# Patient Record
Sex: FEMALE | HISPANIC OR LATINO | Employment: UNEMPLOYED | URBAN - METROPOLITAN AREA
[De-identification: names, ages, dates, MRNs, and addresses within clinical notes are randomized per-mention and may not be internally consistent; named-entity substitution may affect disease eponyms.]

---

## 2023-09-15 ENCOUNTER — APPOINTMENT (OUTPATIENT)
Dept: ULTRASOUND IMAGING | Facility: CLINIC | Age: 23
End: 2023-09-15
Attending: FAMILY MEDICINE

## 2023-09-15 ENCOUNTER — HOSPITAL ENCOUNTER (EMERGENCY)
Facility: CLINIC | Age: 23
Discharge: HOME | End: 2023-09-15
Attending: FAMILY MEDICINE | Admitting: FAMILY MEDICINE

## 2023-09-15 VITALS
WEIGHT: 114.1 LBS | SYSTOLIC BLOOD PRESSURE: 108 MMHG | TEMPERATURE: 98.7 F | HEART RATE: 85 BPM | DIASTOLIC BLOOD PRESSURE: 72 MMHG | RESPIRATION RATE: 16 BRPM | OXYGEN SATURATION: 100 %

## 2023-09-15 DIAGNOSIS — O21.0 HYPEREMESIS GRAVIDARUM: ICD-10-CM

## 2023-09-15 DIAGNOSIS — Z34.90 EARLY STAGE OF PREGNANCY: ICD-10-CM

## 2023-09-15 LAB
ALBUMIN SERPL BCG-MCNC: 4.4 G/DL (ref 3.5–5.2)
ALBUMIN UR-MCNC: NEGATIVE MG/DL
ALP SERPL-CCNC: 98 U/L (ref 35–104)
ALT SERPL W P-5'-P-CCNC: 22 U/L (ref 0–50)
ANION GAP SERPL CALCULATED.3IONS-SCNC: 11 MMOL/L (ref 7–15)
APPEARANCE UR: ABNORMAL
AST SERPL W P-5'-P-CCNC: 24 U/L (ref 0–45)
BACTERIA #/AREA URNS HPF: ABNORMAL /HPF
BASOPHILS # BLD AUTO: 0 10E3/UL (ref 0–0.2)
BASOPHILS NFR BLD AUTO: 1 %
BILIRUB SERPL-MCNC: 0.6 MG/DL
BILIRUB UR QL STRIP: NEGATIVE
BUN SERPL-MCNC: 9.1 MG/DL (ref 6–20)
CALCIUM SERPL-MCNC: 9.3 MG/DL (ref 8.6–10)
CHLORIDE SERPL-SCNC: 106 MMOL/L (ref 98–107)
COLOR UR AUTO: YELLOW
CREAT SERPL-MCNC: 0.56 MG/DL (ref 0.51–0.95)
DEPRECATED HCO3 PLAS-SCNC: 20 MMOL/L (ref 22–29)
EGFRCR SERPLBLD CKD-EPI 2021: >90 ML/MIN/1.73M2
EOSINOPHIL # BLD AUTO: 0.1 10E3/UL (ref 0–0.7)
EOSINOPHIL NFR BLD AUTO: 2 %
ERYTHROCYTE [DISTWIDTH] IN BLOOD BY AUTOMATED COUNT: 15.9 % (ref 10–15)
GLUCOSE SERPL-MCNC: 85 MG/DL (ref 70–99)
GLUCOSE UR STRIP-MCNC: NEGATIVE MG/DL
HCG INTACT+B SERPL-ACNC: 313 MIU/ML
HCG UR QL: POSITIVE
HCT VFR BLD AUTO: 39.7 % (ref 35–47)
HGB BLD-MCNC: 13.3 G/DL (ref 11.7–15.7)
HGB UR QL STRIP: NEGATIVE
HOLD SPECIMEN: NORMAL
IMM GRANULOCYTES # BLD: 0 10E3/UL
IMM GRANULOCYTES NFR BLD: 0 %
INTERNAL QC OK POCT: ABNORMAL
KETONES UR STRIP-MCNC: 20 MG/DL
LEUKOCYTE ESTERASE UR QL STRIP: NEGATIVE
LYMPHOCYTES # BLD AUTO: 2 10E3/UL (ref 0.8–5.3)
LYMPHOCYTES NFR BLD AUTO: 33 %
MCH RBC QN AUTO: 26.4 PG (ref 26.5–33)
MCHC RBC AUTO-ENTMCNC: 33.5 G/DL (ref 31.5–36.5)
MCV RBC AUTO: 79 FL (ref 78–100)
MONOCYTES # BLD AUTO: 0.3 10E3/UL (ref 0–1.3)
MONOCYTES NFR BLD AUTO: 5 %
MUCOUS THREADS #/AREA URNS LPF: PRESENT /LPF
NEUTROPHILS # BLD AUTO: 3.5 10E3/UL (ref 1.6–8.3)
NEUTROPHILS NFR BLD AUTO: 59 %
NITRATE UR QL: NEGATIVE
NRBC # BLD AUTO: 0 10E3/UL
NRBC BLD AUTO-RTO: 0 /100
PH UR STRIP: 5.5 [PH] (ref 5–7)
PLATELET # BLD AUTO: 306 10E3/UL (ref 150–450)
POCT KIT EXPIRATION DATE: ABNORMAL
POCT KIT LOT NUMBER: ABNORMAL
POTASSIUM SERPL-SCNC: 3.8 MMOL/L (ref 3.4–5.3)
PROT SERPL-MCNC: 7.4 G/DL (ref 6.4–8.3)
RBC # BLD AUTO: 5.04 10E6/UL (ref 3.8–5.2)
RBC URINE: <1 /HPF
SODIUM SERPL-SCNC: 137 MMOL/L (ref 136–145)
SP GR UR STRIP: 1.02 (ref 1–1.03)
SQUAMOUS EPITHELIAL: 11 /HPF
UROBILINOGEN UR STRIP-MCNC: NORMAL MG/DL
WBC # BLD AUTO: 5.9 10E3/UL (ref 4–11)
WBC URINE: 3 /HPF

## 2023-09-15 PROCEDURE — 99285 EMERGENCY DEPT VISIT HI MDM: CPT | Mod: 25 | Performed by: FAMILY MEDICINE

## 2023-09-15 PROCEDURE — 81003 URINALYSIS AUTO W/O SCOPE: CPT | Performed by: FAMILY MEDICINE

## 2023-09-15 PROCEDURE — 84702 CHORIONIC GONADOTROPIN TEST: CPT | Performed by: FAMILY MEDICINE

## 2023-09-15 PROCEDURE — 250N000011 HC RX IP 250 OP 636: Mod: JZ | Performed by: FAMILY MEDICINE

## 2023-09-15 PROCEDURE — 85025 COMPLETE CBC W/AUTO DIFF WBC: CPT | Performed by: FAMILY MEDICINE

## 2023-09-15 PROCEDURE — 80053 COMPREHEN METABOLIC PANEL: CPT | Performed by: FAMILY MEDICINE

## 2023-09-15 PROCEDURE — 96374 THER/PROPH/DIAG INJ IV PUSH: CPT | Performed by: FAMILY MEDICINE

## 2023-09-15 PROCEDURE — 99284 EMERGENCY DEPT VISIT MOD MDM: CPT | Performed by: FAMILY MEDICINE

## 2023-09-15 PROCEDURE — 76801 OB US < 14 WKS SINGLE FETUS: CPT

## 2023-09-15 PROCEDURE — 96361 HYDRATE IV INFUSION ADD-ON: CPT | Performed by: FAMILY MEDICINE

## 2023-09-15 PROCEDURE — 258N000003 HC RX IP 258 OP 636: Performed by: FAMILY MEDICINE

## 2023-09-15 PROCEDURE — 81025 URINE PREGNANCY TEST: CPT | Performed by: FAMILY MEDICINE

## 2023-09-15 PROCEDURE — 36415 COLL VENOUS BLD VENIPUNCTURE: CPT | Performed by: FAMILY MEDICINE

## 2023-09-15 RX ORDER — ONDANSETRON 4 MG/1
4 TABLET, ORALLY DISINTEGRATING ORAL EVERY 8 HOURS PRN
Qty: 20 TABLET | Refills: 0 | Status: SHIPPED | OUTPATIENT
Start: 2023-09-15 | End: 2023-09-22

## 2023-09-15 RX ORDER — ONDANSETRON 2 MG/ML
4 INJECTION INTRAMUSCULAR; INTRAVENOUS ONCE
Status: COMPLETED | OUTPATIENT
Start: 2023-09-15 | End: 2023-09-15

## 2023-09-15 RX ADMIN — ONDANSETRON 4 MG: 2 INJECTION INTRAMUSCULAR; INTRAVENOUS at 12:52

## 2023-09-15 RX ADMIN — SODIUM CHLORIDE 1000 ML: 9 INJECTION, SOLUTION INTRAVENOUS at 12:51

## 2023-09-15 ASSESSMENT — ACTIVITIES OF DAILY LIVING (ADL)
ADLS_ACUITY_SCORE: 35
ADLS_ACUITY_SCORE: 35

## 2023-09-15 NOTE — ED TRIAGE NOTES
Pt states she is having RLQ pain; pt admits to N/V with no diarrhea; pt admits to constipation; pt states pain has been present for 15 days; last BM this morning; 7/10 pain

## 2023-09-15 NOTE — DISCHARGE INSTRUCTIONS
Discharge to home May use Zofran for nausea at home follow-up with OB/GYN for early pregnancy may repeat hormone level hCG level in 2 days if having any abdominal pain.

## 2023-09-15 NOTE — ED PROVIDER NOTES
ED Provider Note  [facility]      History     Chief Complaint   Patient presents with    Abdominal Pain     RLQ     HPI  Kaelyn Reed is a 22 year old female who presents to the ER for abdominal pain.    Patient reports abdominal pain and nausea.  This has been happening for over 2 weeks.  She does not have a PCP.  Her last period was over 2 months ago.  She believes there is no chance of pregnancy although she is sexually active.  She states that she is careful.  Patient denies other symptoms no cough fevers chills no cardiac or lung complaint.  Past Medical History  History reviewed. No pertinent past medical history.  History reviewed. No pertinent surgical history.  ondansetron (ZOFRAN ODT) 4 MG ODT tab      No Known Allergies  Family History  History reviewed. No pertinent family history.  Social History   Social History     Tobacco Use    Smoking status: Never    Smokeless tobacco: Never   Substance Use Topics    Alcohol use: Never    Drug use: Never         A complete review of systems was performed with pertinent positives and negatives noted in the HPI, and all other systems negative.    Physical Exam   BP: 111/73  Pulse: 67  Temp: 98.7  F (37.1  C)  Resp: 14  Weight: 51.8 kg (114 lb 1.6 oz)  SpO2: 100 %  Physical Exam  Constitutional:       General: She is not in acute distress.     Appearance: Normal appearance. She is not diaphoretic.   HENT:      Head: Atraumatic.      Mouth/Throat:      Mouth: Mucous membranes are moist.   Eyes:      General: No scleral icterus.     Conjunctiva/sclera: Conjunctivae normal.   Cardiovascular:      Rate and Rhythm: Normal rate.      Heart sounds: Normal heart sounds.   Pulmonary:      Effort: No respiratory distress.      Breath sounds: Normal breath sounds.   Abdominal:      General: Abdomen is flat.      Tenderness: There is no abdominal tenderness.   Musculoskeletal:      Cervical back: Neck supple.   Skin:     General: Skin is warm.       Findings: No rash.   Neurological:      Mental Status: She is alert.           ED Course, Procedures, & Data      Procedures         Medications   sodium chloride 0.9% BOLUS 1,000 mL (0 mLs Intravenous Stopped 9/15/23 1441)   ondansetron (ZOFRAN) injection 4 mg (4 mg Intravenous $Given 9/15/23 1252)     Labs Ordered and Resulted from Time of ED Arrival to Time of ED Departure   COMPREHENSIVE METABOLIC PANEL - Abnormal       Result Value    Sodium 137      Potassium 3.8      Chloride 106      Carbon Dioxide (CO2) 20 (*)     Anion Gap 11      Urea Nitrogen 9.1      Creatinine 0.56      Calcium 9.3      Glucose 85      Alkaline Phosphatase 98      AST 24      ALT 22      Protein Total 7.4      Albumin 4.4      Bilirubin Total 0.6      GFR Estimate >90     ROUTINE UA WITH MICROSCOPIC REFLEX TO CULTURE - Abnormal    Color Urine Yellow      Appearance Urine Slightly Cloudy (*)     Glucose Urine Negative      Bilirubin Urine Negative      Ketones Urine 20 (*)     Specific Gravity Urine 1.020      Blood Urine Negative      pH Urine 5.5      Protein Albumin Urine Negative      Urobilinogen Urine Normal      Nitrite Urine Negative      Leukocyte Esterase Urine Negative      Bacteria Urine Few (*)     Mucus Urine Present (*)     RBC Urine <1      WBC Urine 3      Squamous Epithelials Urine 11 (*)    CBC WITH PLATELETS AND DIFFERENTIAL - Abnormal    WBC Count 5.9      RBC Count 5.04      Hemoglobin 13.3      Hematocrit 39.7      MCV 79      MCH 26.4 (*)     MCHC 33.5      RDW 15.9 (*)     Platelet Count 306      % Neutrophils 59      % Lymphocytes 33      % Monocytes 5      % Eosinophils 2      % Basophils 1      % Immature Granulocytes 0      NRBCs per 100 WBC 0      Absolute Neutrophils 3.5      Absolute Lymphocytes 2.0      Absolute Monocytes 0.3      Absolute Eosinophils 0.1      Absolute Basophils 0.0      Absolute Immature Granulocytes 0.0      Absolute NRBCs 0.0     HCG QUANTITATIVE PREGNANCY - Abnormal    hCG  Quantitative 313 (*)    HCG QUALITATIVE URINE POCT - Abnormal    HCG Qual Urine Positive      Internal QC Check POCT Valid      POCT Kit Lot Number [id number]      POCT Kit Expiration Date 8/28/2024       US OB <14 Weeks W Transvaginal   Final Result   IMPRESSION:       1. No evidence of intrauterine pregnancy or adnexal mass. Findings are   technically consistent with a pregnancy of unknown location.   Short-term follow-up and serial beta hCG is recommended.      2. Trace endometrial fluid. Nonspecific subendometrial cysts.   Differential includes adenomyosis.      3. Ovaries are unremarkable.      ZIGGY JONES MD            SYSTEM ID:  [id number]             Critical care was not performed.     Medical Decision Making  The patient's presentation was of moderate complication acute illness systemic effects..    The patient's evaluation involved:  review of 3+ test result(s) ordered prior to this encounter (see separate area of note for details)    The patient's management necessitated moderate risk (prescription drug management including medications given in the ED).    Assessment & Plan        I have reviewed the nursing notes. I have reviewed the findings, diagnosis, plan and need for follow up with the patient.    Discharge Medication List as of 9/15/2023  3:11 PM        START taking these medications    Details   ondansetron (ZOFRAN ODT) 4 MG ODT tab Take 1 tablet (4 mg) by mouth every 8 hours as needed for nausea, Disp-20 tablet, R-0, E-Prescribe           Patient with nausea vomiting mild abdominal discomfort at this time would appear to have hyperemesis gravidarum I do not believe that she has an exam consistent with any concern for ectopic pregnancy however we did do an hCG level and if she is developing any abdominal pain would recheck the level otherwise will be following up with OB/GYN.            Final diagnoses:   Hyperemesis gravidarum   Early stage of pregnancy     IVaishali, am  serving as a trained medical scribe to document services personally performed by Chandu Durham MD, based on the provider's statements to me.     I, Chandu Durham MD, was physically present and have reviewed and verified the accuracy of this note documented by Vaishali Cornelius.    Chandu Durham MD  [facility] EMERGENCY DEPARTMENT  9/15/2023     Chandu Durham MD  09/15/23 2036

## 2023-10-27 ENCOUNTER — HOSPITAL ENCOUNTER (EMERGENCY)
Facility: CLINIC | Age: 23
Discharge: HOME | End: 2023-10-27
Attending: EMERGENCY MEDICINE | Admitting: EMERGENCY MEDICINE

## 2023-10-27 ENCOUNTER — APPOINTMENT (OUTPATIENT)
Dept: ULTRASOUND IMAGING | Facility: CLINIC | Age: 23
End: 2023-10-27
Attending: EMERGENCY MEDICINE

## 2023-10-27 VITALS
HEART RATE: 65 BPM | OXYGEN SATURATION: 98 % | SYSTOLIC BLOOD PRESSURE: 109 MMHG | TEMPERATURE: 98.7 F | RESPIRATION RATE: 16 BRPM | DIASTOLIC BLOOD PRESSURE: 72 MMHG

## 2023-10-27 DIAGNOSIS — Z34.91 FIRST TRIMESTER PREGNANCY: ICD-10-CM

## 2023-10-27 LAB
ALBUMIN SERPL BCG-MCNC: 4.2 G/DL (ref 3.5–5.2)
ALBUMIN UR-MCNC: NEGATIVE MG/DL
ALP SERPL-CCNC: 78 U/L (ref 35–104)
ALT SERPL W P-5'-P-CCNC: 19 U/L (ref 0–50)
ANION GAP SERPL CALCULATED.3IONS-SCNC: 12 MMOL/L (ref 7–15)
APPEARANCE UR: CLEAR
AST SERPL W P-5'-P-CCNC: 24 U/L (ref 0–45)
BASOPHILS # BLD AUTO: 0.1 10E3/UL (ref 0–0.2)
BASOPHILS NFR BLD AUTO: 1 %
BILIRUB SERPL-MCNC: 0.4 MG/DL
BILIRUB UR QL STRIP: NEGATIVE
BUN SERPL-MCNC: 7.3 MG/DL (ref 6–20)
CALCIUM SERPL-MCNC: 9.2 MG/DL (ref 8.6–10)
CHLORIDE SERPL-SCNC: 102 MMOL/L (ref 98–107)
COLOR UR AUTO: ABNORMAL
CREAT SERPL-MCNC: 0.5 MG/DL (ref 0.51–0.95)
DEPRECATED HCO3 PLAS-SCNC: 19 MMOL/L (ref 22–29)
EGFRCR SERPLBLD CKD-EPI 2021: >90 ML/MIN/1.73M2
EOSINOPHIL # BLD AUTO: 0.1 10E3/UL (ref 0–0.7)
EOSINOPHIL NFR BLD AUTO: 2 %
ERYTHROCYTE [DISTWIDTH] IN BLOOD BY AUTOMATED COUNT: 15 % (ref 10–15)
GLUCOSE SERPL-MCNC: 74 MG/DL (ref 70–99)
GLUCOSE UR STRIP-MCNC: NEGATIVE MG/DL
HCG INTACT+B SERPL-ACNC: ABNORMAL MIU/ML
HCT VFR BLD AUTO: 37.6 % (ref 35–47)
HGB BLD-MCNC: 12.6 G/DL (ref 11.7–15.7)
HGB UR QL STRIP: NEGATIVE
IMM GRANULOCYTES # BLD: 0 10E3/UL
IMM GRANULOCYTES NFR BLD: 0 %
KETONES UR STRIP-MCNC: NEGATIVE MG/DL
LEUKOCYTE ESTERASE UR QL STRIP: NEGATIVE
LIPASE SERPL-CCNC: 27 U/L (ref 13–60)
LYMPHOCYTES # BLD AUTO: 1.9 10E3/UL (ref 0.8–5.3)
LYMPHOCYTES NFR BLD AUTO: 30 %
MAGNESIUM SERPL-MCNC: 2 MG/DL (ref 1.7–2.3)
MCH RBC QN AUTO: 26.9 PG (ref 26.5–33)
MCHC RBC AUTO-ENTMCNC: 33.5 G/DL (ref 31.5–36.5)
MCV RBC AUTO: 80 FL (ref 78–100)
MONOCYTES # BLD AUTO: 0.4 10E3/UL (ref 0–1.3)
MONOCYTES NFR BLD AUTO: 6 %
MUCOUS THREADS #/AREA URNS LPF: PRESENT /LPF
NEUTROPHILS # BLD AUTO: 3.9 10E3/UL (ref 1.6–8.3)
NEUTROPHILS NFR BLD AUTO: 61 %
NITRATE UR QL: NEGATIVE
NRBC # BLD AUTO: 0 10E3/UL
NRBC BLD AUTO-RTO: 0 /100
PH UR STRIP: 6 [PH] (ref 5–7)
PLATELET # BLD AUTO: 326 10E3/UL (ref 150–450)
POTASSIUM SERPL-SCNC: 4.1 MMOL/L (ref 3.4–5.3)
PROT SERPL-MCNC: 7 G/DL (ref 6.4–8.3)
RBC # BLD AUTO: 4.68 10E6/UL (ref 3.8–5.2)
RBC URINE: 0 /HPF
SODIUM SERPL-SCNC: 133 MMOL/L (ref 135–145)
SP GR UR STRIP: 1.02 (ref 1–1.03)
SQUAMOUS EPITHELIAL: 10 /HPF
UROBILINOGEN UR STRIP-MCNC: NORMAL MG/DL
WBC # BLD AUTO: 6.3 10E3/UL (ref 4–11)
WBC URINE: 2 /HPF

## 2023-10-27 PROCEDURE — 83735 ASSAY OF MAGNESIUM: CPT | Performed by: EMERGENCY MEDICINE

## 2023-10-27 PROCEDURE — 85025 COMPLETE CBC W/AUTO DIFF WBC: CPT | Performed by: EMERGENCY MEDICINE

## 2023-10-27 PROCEDURE — 81001 URINALYSIS AUTO W/SCOPE: CPT | Performed by: EMERGENCY MEDICINE

## 2023-10-27 PROCEDURE — 84702 CHORIONIC GONADOTROPIN TEST: CPT | Performed by: EMERGENCY MEDICINE

## 2023-10-27 PROCEDURE — 76801 OB US < 14 WKS SINGLE FETUS: CPT

## 2023-10-27 PROCEDURE — 36415 COLL VENOUS BLD VENIPUNCTURE: CPT | Performed by: EMERGENCY MEDICINE

## 2023-10-27 PROCEDURE — 80053 COMPREHEN METABOLIC PANEL: CPT | Performed by: EMERGENCY MEDICINE

## 2023-10-27 PROCEDURE — 99284 EMERGENCY DEPT VISIT MOD MDM: CPT | Performed by: EMERGENCY MEDICINE

## 2023-10-27 PROCEDURE — 83690 ASSAY OF LIPASE: CPT | Performed by: EMERGENCY MEDICINE

## 2023-10-27 PROCEDURE — 99284 EMERGENCY DEPT VISIT MOD MDM: CPT | Mod: 25 | Performed by: EMERGENCY MEDICINE

## 2023-10-27 RX ORDER — PRENATAL VIT/IRON FUM/FOLIC AC 27MG-0.8MG
1 TABLET ORAL DAILY
Qty: 90 TABLET | Refills: 3 | Status: SHIPPED | OUTPATIENT
Start: 2023-10-27 | End: 2023-11-08

## 2023-10-27 RX ORDER — ACETAMINOPHEN 500 MG
1000 TABLET ORAL EVERY 6 HOURS PRN
Qty: 60 TABLET | Refills: 0 | Status: SHIPPED | OUTPATIENT
Start: 2023-10-27 | End: 2023-11-04

## 2023-10-27 NOTE — ED PROVIDER NOTES
[facility] EMERGENCY DEPARTMENT ([facility])    10/27/23      ED PROVIDER NOTE    History     Chief Complaint   Patient presents with    Pregnancy Complications     Patient having severe lower abdominal pain and cramping, reports it started 3 days ago. Patient denies vaginal bleeding.      HPI  Kaelyn Reed is a 22 year old pregnant female presenting to the ED for evaluation of pregnancy. Reports suprapubic pain. No bleeding. History obtained with an  and provided by the patient and her significant other. Patient reports that she has had a few days of suprapubic discomfort and has not yet had imaging to establish confirmation of intrauterine pregnancy. She denies vaginal bleeding or discharge. Denies fevers, chills, vomiting, diarrhea, chest pain, shortness of breath, or other acute complaints at this time. She has not been taking prenatal vitamins and is not yet established with obgyn.         Past Medical History  No past medical history on file.  No past surgical history on file.  acetaminophen (TYLENOL) 500 MG tablet  Prenatal Vit-Fe Fumarate-FA (PRENATAL MULTIVITAMIN W/IRON) 27-0.8 MG tablet      No Known Allergies  Family History  No family history on file.  Social History   Social History     Tobacco Use    Smoking status: Never    Smokeless tobacco: Never   Substance Use Topics    Alcohol use: Never    Drug use: Never      Past medical history, past surgical history, medications, allergies, family history, and social history were reviewed with the patient. No additional pertinent items.      A complete review of systems was performed with pertinent positives and negatives noted in the HPI, and all other systems negative.    Physical Exam     BP: 109/72  Pulse: 65  Temp: 98.7  F (37.1  C)  Resp: 16  SpO2: 98 %    Physical Exam  Vitals reviewed.   Constitutional:       Appearance: Normal appearance.   HENT:      Head: Normocephalic and atraumatic.      Mouth/Throat:      Mouth:  Mucous membranes are dry.   Eyes:      Extraocular Movements: Extraocular movements intact.      Pupils: Pupils are equal, round, and reactive to light.   Cardiovascular:      Rate and Rhythm: Normal rate and regular rhythm.   Pulmonary:      Effort: Pulmonary effort is normal. No respiratory distress.   Abdominal:      Palpations: Abdomen is soft.      Tenderness: There is no abdominal tenderness.   Musculoskeletal:      Cervical back: Normal range of motion and neck supple.   Skin:     General: Skin is warm and dry.   Neurological:      General: No focal deficit present.      Mental Status: She is alert and oriented to person, place, and time.   Psychiatric:         Mood and Affect: Mood normal.         Behavior: Behavior normal.           ED Course, Procedures, & Data        Procedures                 Results for orders placed or performed during the hospital encounter of 10/27/23   US OB < 14 Weeks Single     Status: None    Narrative    ULTRASOUND OBSTETRIC FIRST TRIMESTER October 27, 2023 1:25 PM    HISTORY: Abdomen pain, pregnancy 1st trimester, no prior ultrasound  confirmation.    TECHNIQUE: Transabdominal imaging was performed.      COMPARISON: None.    FINDINGS:      Estimated gestational age by current ultrasound measurement: 10 weeks  2 days.  Estimated date of delivery based on this ultrasound: May 22, 2024.  Crown-rump length: 3.3 cm.   Embryonic cardiac activity: 167 BPM.   Yolk sac: Present. Unremarkable shape.  Subchorionic hemorrhage: None.  Amniotic fluid volume: Unremarkable for gestational age.    Right ovary: Unremarkable.  Left ovary: Corpus luteum.  Adnexal mass: None.  Free pelvic fluid: None.      Impression    IMPRESSION: Single living intrauterine gestation, no acute process  demonstrated.    DOUG COLLADO MD         SYSTEM ID:  [id number]   Comprehensive metabolic panel     Status: Abnormal   Result Value Ref Range    Sodium 133 (L) 135 - 145 mmol/L    Potassium 4.1 3.4 - 5.3 mmol/L     Carbon Dioxide (CO2) 19 (L) 22 - 29 mmol/L    Anion Gap 12 7 - 15 mmol/L    Urea Nitrogen 7.3 6.0 - 20.0 mg/dL    Creatinine 0.50 (L) 0.51 - 0.95 mg/dL    GFR Estimate >90 >60 mL/min/1.73m2    Calcium 9.2 8.6 - 10.0 mg/dL    Chloride 102 98 - 107 mmol/L    Glucose 74 70 - 99 mg/dL    Alkaline Phosphatase 78 35 - 104 U/L    AST 24 0 - 45 U/L    ALT 19 0 - 50 U/L    Protein Total 7.0 6.4 - 8.3 g/dL    Albumin 4.2 3.5 - 5.2 g/dL    Bilirubin Total 0.4 <=1.2 mg/dL   Lipase     Status: Normal   Result Value Ref Range    Lipase 27 13 - 60 U/L   Magnesium     Status: Normal   Result Value Ref Range    Magnesium 2.0 1.7 - 2.3 mg/dL   HCG quantitative pregnancy (blood)     Status: Abnormal   Result Value Ref Range    hCG Quantitative 110,450 (H) <5 mIU/mL   UA with Microscopic reflex to Culture     Status: Abnormal    Specimen: Urine, Clean Catch   Result Value Ref Range    Color Urine Orange (A) Colorless, Straw, Light Yellow, Yellow    Appearance Urine Clear Clear    Glucose Urine Negative Negative mg/dL    Bilirubin Urine Negative Negative    Ketones Urine Negative Negative mg/dL    Specific Gravity Urine 1.018 1.003 - 1.035    Blood Urine Negative Negative    pH Urine 6.0 5.0 - 7.0    Protein Albumin Urine Negative Negative mg/dL    Urobilinogen Urine Normal Normal, 2.0 mg/dL    Nitrite Urine Negative Negative    Leukocyte Esterase Urine Negative Negative    Mucus Urine Present (A) None Seen /LPF    RBC Urine 0 <=2 /HPF    WBC Urine 2 <=5 /HPF    Squamous Epithelials Urine 10 (H) <=1 /HPF    Narrative    Urine Culture not indicated   CBC with platelets and differential     Status: None   Result Value Ref Range    WBC Count 6.3 4.0 - 11.0 10e3/uL    RBC Count 4.68 3.80 - 5.20 10e6/uL    Hemoglobin 12.6 11.7 - 15.7 g/dL    Hematocrit 37.6 35.0 - 47.0 %    MCV 80 78 - 100 fL    MCH 26.9 26.5 - 33.0 pg    MCHC 33.5 31.5 - 36.5 g/dL    RDW 15.0 10.0 - 15.0 %    Platelet Count 326 150 - 450 10e3/uL    % Neutrophils 61 %    %  Lymphocytes 30 %    % Monocytes 6 %    % Eosinophils 2 %    % Basophils 1 %    % Immature Granulocytes 0 %    NRBCs per 100 WBC 0 <1 /100    Absolute Neutrophils 3.9 1.6 - 8.3 10e3/uL    Absolute Lymphocytes 1.9 0.8 - 5.3 10e3/uL    Absolute Monocytes 0.4 0.0 - 1.3 10e3/uL    Absolute Eosinophils 0.1 0.0 - 0.7 10e3/uL    Absolute Basophils 0.1 0.0 - 0.2 10e3/uL    Absolute Immature Granulocytes 0.0 <=0.4 10e3/uL    Absolute NRBCs 0.0 10e3/uL   CBC with platelets differential     Status: None    Narrative    The following orders were created for panel order CBC with platelets differential.  Procedure                               Abnormality         Status                     ---------                               -----------         ------                     CBC with platelets and d...[[id number]]                      Final result                 Please view results for these tests on the individual orders.     Medications - No data to display  Labs Ordered and Resulted from Time of ED Arrival to Time of ED Departure   COMPREHENSIVE METABOLIC PANEL - Abnormal       Result Value    Sodium 133 (*)     Potassium 4.1      Carbon Dioxide (CO2) 19 (*)     Anion Gap 12      Urea Nitrogen 7.3      Creatinine 0.50 (*)     GFR Estimate >90      Calcium 9.2      Chloride 102      Glucose 74      Alkaline Phosphatase 78      AST 24      ALT 19      Protein Total 7.0      Albumin 4.2      Bilirubin Total 0.4     HCG QUANTITATIVE PREGNANCY - Abnormal    hCG Quantitative 110,450 (*)    ROUTINE UA WITH MICROSCOPIC REFLEX TO CULTURE - Abnormal    Color Urine Orange (*)     Appearance Urine Clear      Glucose Urine Negative      Bilirubin Urine Negative      Ketones Urine Negative      Specific Gravity Urine 1.018      Blood Urine Negative      pH Urine 6.0      Protein Albumin Urine Negative      Urobilinogen Urine Normal      Nitrite Urine Negative      Leukocyte Esterase Urine Negative      Mucus Urine Present (*)     RBC Urine 0       WBC Urine 2      Squamous Epithelials Urine 10 (*)    LIPASE - Normal    Lipase 27     MAGNESIUM - Normal    Magnesium 2.0     CBC WITH PLATELETS AND DIFFERENTIAL    WBC Count 6.3      RBC Count 4.68      Hemoglobin 12.6      Hematocrit 37.6      MCV 80      MCH 26.9      MCHC 33.5      RDW 15.0      Platelet Count 326      % Neutrophils 61      % Lymphocytes 30      % Monocytes 6      % Eosinophils 2      % Basophils 1      % Immature Granulocytes 0      NRBCs per 100 WBC 0      Absolute Neutrophils 3.9      Absolute Lymphocytes 1.9      Absolute Monocytes 0.4      Absolute Eosinophils 0.1      Absolute Basophils 0.1      Absolute Immature Granulocytes 0.0      Absolute NRBCs 0.0       US OB < 14 Weeks Single   Final Result   IMPRESSION: Single living intrauterine gestation, no acute process   demonstrated.      DOUG COLLADO MD            SYSTEM ID:  [id number]             Critical care was not performed.     Medical Decision Making  The patient's presentation was of moderate complexity (an undiagnosed new problem with uncertain diagnosis).    The patient's evaluation involved:  ordering and/or review of 3+ test(s) in this encounter (see separate area of note for details)    The patient's management necessitated only low risk treatment.    Assessment & Plan        Kaelyn Reed is a 22 year old pregnant female presenting to the ED for evaluation of pregnancy.  Patient is nontoxic appearing in the emergency department and has vital signs within normal limits. She has a nontender abdomen with very early gravid appearance. Ultrasound obtained and consistent with viable intrauterine pregnancy at about 10 weeks gestation. Patient had no vaginal bleeding and therefore no need for consideration of rhogam at this time. Basic labs reassuring. OBGYN referral placed and prenatal vitamins prescribed. Patient felt reassured with these findings and was discharged with outpatient follow up and return precautions.  Urinalysis negative for infection.     I have reviewed the nursing notes. I have reviewed the findings, diagnosis, plan and need for follow up with the patient.    Discharge Medication List as of 10/27/2023  3:14 PM        START taking these medications    Details   acetaminophen (TYLENOL) 500 MG tablet Take 2 tablets (1,000 mg) by mouth every 6 hours as needed for pain or fever, Disp-60 tablet, R-0, Local Print      Prenatal Vit-Fe Fumarate-FA (PRENATAL MULTIVITAMIN W/IRON) 27-0.8 MG tablet Take 1 tablet by mouth daily, Disp-90 tablet, R-3, Local Print             Final diagnoses:   First trimester pregnancy     Ceasar Hdz MD  [facility] EMERGENCY DEPARTMENT  10/27/2023     Ceasar Hdz MD  10/31/23 1511

## 2023-10-27 NOTE — ED TRIAGE NOTES
Patient has severe lower abdominal pain. Patient found out she was pregnant when she here a month ago. Patient is unsure how far along she is, but is in the first trimester. Patient reports for the past three days she has had cramping and constant pain.

## 2023-11-08 ENCOUNTER — VIRTUAL VISIT (OUTPATIENT)
Dept: OBGYN | Facility: CLINIC | Age: 23
End: 2023-11-08

## 2023-11-08 DIAGNOSIS — Z34.91 FIRST TRIMESTER PREGNANCY: Primary | ICD-10-CM

## 2023-11-08 DIAGNOSIS — Z13.31 POSITIVE SCREENING FOR DEPRESSION ON 9-ITEM PATIENT HEALTH QUESTIONNAIRE (PHQ-9): ICD-10-CM

## 2023-11-08 DIAGNOSIS — O21.9 NAUSEA AND VOMITING IN PREGNANCY: ICD-10-CM

## 2023-11-08 RX ORDER — PYRIDOXINE HCL (VITAMIN B6) 25 MG
25 TABLET ORAL EVERY 8 HOURS PRN
Qty: 90 TABLET | Refills: 1 | Status: ON HOLD | OUTPATIENT
Start: 2023-11-08 | End: 2024-05-13

## 2023-11-08 RX ORDER — PRENATAL VIT/IRON FUM/FOLIC AC 27MG-0.8MG
1 TABLET ORAL DAILY
Qty: 90 TABLET | Refills: 3 | Status: SHIPPED | OUTPATIENT
Start: 2023-11-08 | End: 2024-03-22

## 2023-11-08 ASSESSMENT — PATIENT HEALTH QUESTIONNAIRE - PHQ9: SUM OF ALL RESPONSES TO PHQ QUESTIONS 1-9: 10

## 2023-11-08 NOTE — PATIENT INSTRUCTIONS
Valorie por confiarnos fitzpatrick cuidado!    Si necesita contactarnos para preguntas sobre:  Síntomas, programación y preguntas médicas; No urgente (respuesta de 2 a 3 días) Mensaje justino Turpin Urgent[place] (necesita respuesta hoza) [phone] (si responde después de las 3:30 p. m. del día siguiente)  Recetas: llame a fitzpatrick farmacia  Facturación: [place] [phone] o M Médicos: [phone]   Learning About Pregnancy  Your Care Instructions     Your health in the early weeks of your pregnancy is particularly important for your baby's health. Take good care of yourself. Anything you do that harms your body can also harm your baby.  Make sure to go to all of your doctor appointments. Regular checkups will help keep you and your baby healthy.  How can you care for yourself at home?  Diet    Eat a balanced diet. Make sure your diet includes plenty of beans, peas, and leafy green vegetables.     Do not skip meals or go for many hours without eating. If you are nauseated, try to eat a small, healthy snack every 2 to 3 hours.     Do not eat fish that has a high level of mercury, such as shark, swordfish, or mackerel. Do not eat more than one can of tuna each week.     Drink plenty of fluids. If you have kidney, heart, or liver disease and have to limit fluids, talk with your doctor before you increase the amount of fluids you drink.     Cut down on caffeine, such as coffee, tea, and cola.     Do not drink alcohol, such as beer, wine, or hard liquor.     Take a multivitamin that contains at least 400 micrograms (mcg) of folic acid to help prevent birth defects. Fortified cereal and whole wheat bread are good additional sources of folic acid.     Increase the calcium in your diet. Try to drink a quart of skim milk each day. You may also take calcium supplements and choose foods such as cheese and yogurt.   Lifestyle    Make sure you go to your follow-up appointments.     Get plenty of rest. You may be unusually tired while you are  pregnant.     Get at least 30 minutes of exercise on most days of the week. Walking is a good choice. If you have not exercised in the past, start out slowly. Take several short walks each day.     Do not smoke. If you need help quitting, talk to your doctor about stop-smoking programs. These can increase your chances of quitting for good.     Do not touch cat feces or litter boxes. Also, wash your hands after you handle raw meat, and fully cook all meat before you eat it. Wear gloves when you work in the yard or garden, and wash your hands well when you are done. Cat feces, raw or undercooked meat, and contaminated dirt can cause an infection that may harm your baby or lead to a miscarriage.     Avoid things that can make your body too hot and may be harmful to your baby, such as a hot tub or sauna. Or talk with your doctor before doing anything that raises your body temperature. Your doctor can tell you if it's safe.     Avoid chemical fumes, paint fumes, or poisons.     Do not use illegal drugs, marijuana, or alcohol.   Medicines    Review all of your medicines with your doctor. Some of your routine medicines may need to be changed to protect your baby.     Use acetaminophen (Tylenol) to relieve minor problems, such as a mild headache or backache or a mild fever with cold symptoms. Do not use nonsteroidal anti-inflammatory drugs (NSAIDs), such as ibuprofen (Advil, Motrin) or naproxen (Aleve), unless your doctor says it is okay.     Do not take two or more pain medicines at the same time unless the doctor told you to. Many pain medicines have acetaminophen, which is Tylenol. Too much acetaminophen (Tylenol) can be harmful.     Take your medicines exactly as prescribed. Call your doctor if you think you are having a problem with your medicine.   To manage morning sickness    If you feel sick when you first wake up, try eating a small snack (such as crackers) before you get out of bed. Allow some time to digest the  "snack, and then get out of bed slowly.     Do not skip meals or go for long periods without eating. An empty stomach can make nausea worse.     Eat small, frequent meals instead of three large meals each day.     Drink plenty of fluids.     Eat foods that are high in protein but low in fat.     If you are taking iron supplements, ask your doctor if they are necessary. Iron can make nausea worse.     Avoid any smells, such as coffee, that make you feel sick.     Get lots of rest. Morning sickness may be worse when you are tired.   Follow-up care is a key part of your treatment and safety. Be sure to make and go to all appointments, and call your doctor if you are having problems. It's also a good idea to know your test results and keep a list of the medicines you take.  Where can you learn more?  Go to https://www.[employer].net/patiented  Enter E868 in the search box to learn more about \"Learning About Pregnancy.\"  Current as of: July 11, 2023               Content Version: 13.8    2757-5540 [facility].   Care instructions adapted under license by your healthcare professional. If you have questions about a medical condition or this instruction, always ask your healthcare professional. [facility] disclaims any warranty or liability for your use of this information.      Weeks 6 to 10 of Your Pregnancy: Care Instructions  During these weeks of pregnancy, your body goes through many changes. You may start to feel different, both in your body and your emotions. Each pregnancy is different, so there's no \"right\" way to feel. These early weeks are a time to make healthy choices for you and your pregnancy.    Take a daily prenatal vitamin. Choose one with folic acid in it.   Avoid alcohol, tobacco, and drugs (including marijuana). If you need help quitting, talk to your doctor.     Drink plenty of liquids.  Be sure to drink enough water. And limit sodas, other sweetened drinks, and caffeine.     " "Choose foods that are good sources of calcium, iron, and folate.  You can try dairy products, dark leafy greens, fortified orange juice and cereals, almonds, broccoli, dried fruit, and beans.     Avoid foods that may be harmful.  Don't eat raw meat, deli meat, raw seafood, or raw eggs. Avoid soft cheese and unpasteurized dairy, like Brie and blue cheese. And don't eat fish that contains a lot of mercury, like shark and swordfish.     Don't touch sol litter or cat poop.  They can cause an infection that could be harmful during pregnancy.     Avoid things that can make your body too hot.  For example, avoid hot tubs and saunas.     Soothe morning sickness.  Try eating 5 or 6 small meals a day, getting some fresh air, or using adam to control symptoms.     Ask your doctor about flu and COVID-19 shots.  Getting them can help protect against infection.   Follow-up care is a key part of your treatment and safety. Be sure to make and go to all appointments, and call your doctor if you are having problems. It's also a good idea to know your test results and keep a list of the medicines you take.  Where can you learn more?  Go to https://www.GoEuro.net/patiented  Enter G112 in the search box to learn more about \"Weeks 6 to 10 of Your Pregnancy: Care Instructions.\"  Current as of: July 11, 2023               Content Version: 13.8    8349-8603 [employer].   Care instructions adapted under license by your healthcare professional. If you have questions about a medical condition or this instruction, always ask your healthcare professional. [employer] disclaims any warranty or liability for your use of this information.         Managing Morning Sickness (01:55)  Your health professional recommends that you watch this short online health video.  Learn tips for dealing with morning sickness, no matter what time of day you have it.  Purpose:  Gives tips for managing morning sickness, including " eating small low-fat meals and avoiding caffeine and spicy food.  Goal:  The user will learn tips for dealing with morning sickness during pregnancy.     How to watch the video    Scan the QR code   OR Visit the website    [email or web address]   Current as of: July 11, 2023               Content Version: 13.8    9412-3659 [employer].   Care instructions adapted under license by your healthcare professional. If you have questions about a medical condition or this instruction, always ask your healthcare professional. [employer] disclaims any warranty or liability for your use of this information.      Pregnancy and Heartburn: Care Instructions  Overview     Heartburn is a common problem during pregnancy.  Heartburn happens when stomach acid backs up into the tube that carries food to the stomach. This tube is called the esophagus. Early in pregnancy, heartburn is caused by hormone changes that slow down digestion. Later on, it's also caused by the large uterus pushing up on the stomach.  Even though you can't fix the cause, there are things you can do to get relief. Treating heartburn during pregnancy focuses first on making lifestyle changes, like changing what and how you eat, and on taking medicines.  Heartburn usually improves or goes away after childbirth.  Follow-up care is a key part of your treatment and safety. Be sure to make and go to all appointments, and call your doctor if you are having problems. It's also a good idea to know your test results and keep a list of the medicines you take.  How can you care for yourself at home?  Eat small, frequent meals.  Avoid foods that make your symptoms worse, such as chocolate, peppermint, and spicy foods. Avoid drinks with caffeine, such as coffee, tea, and sodas.  Avoid bending over or lying down after meals.  Take a short walk after you eat.  If heartburn is a problem at night, do not eat for 2 hours before  "bedtime.  Take antacids like Mylanta, Maalox, Rolaids, or Tums. Do not take antacids that have sodium bicarbonate, magnesium trisilicate, or aspirin. Be careful when you take over-the-counter antacid medicines. Many of these medicines have aspirin in them. While you are pregnant, do not take aspirin or medicines that contain aspirin unless your doctor says it is okay.  If you're not getting relief, talk to your doctor. You may be able to take a stronger acid-reducing medicine.  When should you call for help?   Call your doctor now or seek immediate medical care if:    You have new or worse belly pain.     You are vomiting.   Watch closely for changes in your health, and be sure to contact your doctor if:    You have new or worse symptoms of reflux.     You are losing weight.     You have trouble or pain swallowing.     You do not get better as expected.   Where can you learn more?  Go to https://www.Lumi Mobile.Listiki/patiented  Enter U946 in the search box to learn more about \"Pregnancy and Heartburn: Care Instructions.\"  Current as of: July 11, 2023               Content Version: 13.8    9154-8001 [employer].   Care instructions adapted under license by your healthcare professional. If you have questions about a medical condition or this instruction, always ask your healthcare professional. [employer] disclaims any warranty or liability for your use of this information.      Constipation: Care Instructions  Overview     Constipation means that you have a hard time passing stools (bowel movements). People pass stools from 3 times a day to once every 3 days. What is normal for you may be different. Constipation may occur with pain in the rectum and cramping. The pain may get worse when you try to pass stools. Sometimes there are small amounts of bright red blood on toilet paper or the surface of stools. This is because of enlarged veins near the rectum (hemorrhoids).  A few changes in your " diet and lifestyle may help you avoid ongoing constipation. Your doctor may also prescribe medicine to help loosen your stool.  Some medicines can cause constipation. These include pain medicines and antidepressants. Tell your doctor about all the medicines you take. Your doctor may want to make a medicine change to ease your symptoms.  Follow-up care is a key part of your treatment and safety. Be sure to make and go to all appointments, and call your doctor if you are having problems. It's also a good idea to know your test results and keep a list of the medicines you take.  How can you care for yourself at home?  Drink plenty of fluids. If you have kidney, heart, or liver disease and have to limit fluids, talk with your doctor before you increase the amount of fluids you drink.  Include high-fiber foods in your diet each day. These include fruits, vegetables, beans, and whole grains.  Get at least 30 minutes of exercise on most days of the week. Walking is a good choice. You also may want to do other activities, such as running, swimming, cycling, or playing tennis or team sports.  Take a fiber supplement, such as Citrucel or Metamucil, every day. Read and follow all instructions on the label.  Schedule time each day for a bowel movement. A daily routine may help. Take your time having a bowel movement, but don't sit for more than 10 minutes at a time. And don't strain too much.  Support your feet with a small step stool when you sit on the toilet. This helps flex your hips and places your pelvis in a squatting position.  Your doctor may recommend an over-the-counter laxative to relieve your constipation. Examples are Milk of Magnesia and MiraLax. Read and follow all instructions on the label. Do not use laxatives on a long-term basis.  When should you call for help?   Call your doctor now or seek immediate medical care if:    You have new or worse belly pain.     You have new or worse nausea or vomiting.     You  "have blood in your stools.   Watch closely for changes in your health, and be sure to contact your doctor if:    Your constipation is getting worse.     You do not get better as expected.   Where can you learn more?  Go to https://www.[employer].net/patiented  Enter P343 in the search box to learn more about \"Constipation: Care Instructions.\"  Current as of: March 21, 2023               Content Version: 13.8    5168-5511 [employer].   Care instructions adapted under license by your healthcare professional. If you have questions about a medical condition or this instruction, always ask your healthcare professional. [employer] disclaims any warranty or liability for your use of this information.      Learning About High-Iron Foods  What foods are high in iron?     The foods you eat contain nutrients, such as vitamins and minerals. Iron is a nutrient. Your body needs the right amount to stay healthy and work as it should. You can use the list below to help you make choices about which foods to eat.  Here are some foods that contain iron. They have 1 to 2 milligrams of iron per serving.  Fruits  Figs (dried), 5 figs  Vegetables  Asparagus (canned), 6 pritchard  Reji, beet, Swiss chard, or turnip greens, 1 cup  Dried peas, cooked,   cup  Seaweed, spirulina (dried),   cup  Spinach, (cooked)   cup or (raw) 1 cup  Grains  Cereals, fortified with iron, 1 cup  Grits (instant, cooked), fortified with iron,   cup  Meats and other protein foods  Beans (kidney, lima, navy, white), canned or cooked,   cup  Beef or lamb, 3 oz  Chicken giblets, 3 oz  Chickpeas (garbanzo beans),   cup  Liver of beef, lamb, or pork, 3 oz  Oysters (cooked), 3 oz  Sardines (canned), 3 oz  Soybeans (boiled),   cup  Tofu (firm),   cup  Work with your doctor to find out how much of this nutrient you need. Depending on your health, you may need more or less of it in your diet.  Where can you learn more?  Go to " "https://www.DataArt.net/patiented  Enter R005 in the search box to learn more about \"Learning About High-Iron Foods.\"  Current as of: February 28, 2023               Content Version: 13.8 2006-2023 [employer].   Care instructions adapted under license by your healthcare professional. If you have questions about a medical condition or this instruction, always ask your healthcare professional. [employer] disclaims any warranty or liability for your use of this information.      Rh Antibodies Screening During Pregnancy: About This Test  What is it?     The Rh antibodies screening test is a blood test. It checks your blood for Rh antibodies. If you have Rh-negative blood and have been exposed to Rh-positive blood, your immune system may make antibodies to attack the Rh-positive blood. When a pregnant woman has these antibodies, it is called Rh sensitization.  Why is this test done?  The Rh antibodies screening test is done during pregnancy to find out if your baby is at risk for Rh disease. This can happen if you have Rh-negative blood and your baby has Rh-positive blood. If your Rh-negative blood mixes with Rh-positive blood, your immune system will make antibodies to attack the Rh-positive blood.  During pregnancy, these antibodies could attach to the baby's red blood cells. This can cause your baby to have serious health problems. The results of this test will help your doctor know how to best care for you and your baby during your pregnancy.  How do you prepare for the test?  In general, there's nothing you have to do before this test, unless your doctor tells you to.  How is the test done?  A health professional uses a needle to take a blood sample, usually from the arm.  What happens after the test?  You will probably be able to go home right away. It depends on the reason for the test.  You can go back to your usual activities right away.  Follow-up care is a key part of your " "treatment and safety. Be sure to make and go to all appointments, and call your doctor if you are having problems. It's also a good idea to keep a list of the medicines you take. Ask your doctor when you can expect to have your test results.  Where can you learn more?  Go to https://www.[employer]Key [employer].net/patiented  Enter P722 in the search box to learn more about \"Rh Antibodies Screening During Pregnancy: About This Test.\"  Current as of: 2023               Content Version: 13.8    0129-4888 [employer].   Care instructions adapted under license by your healthcare professional. If you have questions about a medical condition or this instruction, always ask your healthcare professional. [employer] disclaims any warranty or liability for your use of this information.      Learning About Preventing Rh Disease  What is Rh disease?     Rh disease can be a serious problem in pregnancy. It happens when substances called antibodies in the mother's blood cause red blood cells in her baby's blood to be destroyed. This can occur when the blood types of a mother and her baby do not match.  All blood has an Rh factor. This is what makes a blood type positive or negative. When you are Rh-negative, your baby may be Rh-negative or Rh-positive. If your baby has Rh-positive blood and it mixes with yours, your body will make antibodies. This is called Rh sensitization.  Most of the time, this is not a problem in a first pregnancy. But in future pregnancies, it could cause Rh disease.  A  with Rh disease has mild anemia and may have jaundice. In severe cases, anemia, jaundice, and swelling can be very dangerous or fatal. Some babies need to be delivered early. Some need special care in the NICU. A very sick baby will need a blood transfusion before or after birth.  Fortunately, Rh sensitization is usually easy to prevent.  That's why it's important to get your Rh status checked in your first " "trimester. It doesn't cause any warning signs. A blood test is the only way to know if you are Rh-sensitive or are at risk for it.  How can you prevent Rh disease?  If you are Rh-negative, your doctor gives you an Rh immune globulin shot (such as RhoGAM). It helps prevent your body from making the antibodies that attack your baby's red blood cells.  Timing is important. You need the shot at certain times during your pregnancy. And you need one anytime there is a chance that your baby's blood might mix with yours. That can happen with certain prenatal tests or when you have pregnancy bleeding, such as:  Right after any pregnancy loss, amniocentesis, or CVS testing.  After turning of a breech baby.  Before and maybe after childbirth. Your doctor gives you a shot around week 28. If your  is Rh-positive, you will have another shot.  Follow-up care is a key part of your treatment and safety. Be sure to make and go to all appointments, and call your doctor if you are having problems. It's also a good idea to know your test results and keep a list of the medicines you take.  Where can you learn more?  Go to https://www.MiTÃº.net/patiented  Enter W177 in the search box to learn more about \"Learning About Preventing Rh Disease.\"  Current as of: 2023               Content Version: 13.8    7609-9734 [employer].   Care instructions adapted under license by your healthcare professional. If you have questions about a medical condition or this instruction, always ask your healthcare professional. [employer] disclaims any warranty or liability for your use of this information.      Learning About Rh Immunoglobulin Shots  Introduction     An Rh immunoglobulin shot is given to pregnant women who have Rh-negative blood.  You may have Rh-negative blood, and your baby may have Rh-positive blood. If the two types of blood mix, your body will make antibodies. This is called Rh " sensitization. Most of the time, this is not a problem the first time you're pregnant. But it could cause problems in future pregnancies.  This shot keeps your body from making the antibodies. You get the shot around 28 weeks of pregnancy. After the birth, your baby's blood is tested. If the blood is Rh positive, you will get another shot. You may also get the shot if you have vaginal bleeding while you are pregnant or if you have a miscarriage. These shots protect future pregnancies.  Women with Rh negative blood will need this shot each time they get pregnant.  Example  Rh immunoglobulin (HypRho-D, MICRhoGAM, and RhoGAM)  Possible side effects  Rare side effects may include:  Some mild pain where you got the shot.  A slight fever.  An allergic reaction.  You may have other side effects not listed here. Check the information that comes with your medicine.  What to know about taking this medicine  You may need more than one shot. You may need the shot again:  After amniocentesis, fetal blood sampling, or chorionic villus sampling tests.  If you have bleeding in your second or third trimester.  After turning of a breech baby.  After an injury to the belly while you are pregnant.  After a miscarriage or an .  Before or right after treatment for an ectopic or a partial molar pregnancy.  Tell your doctor if you have any allergies or have had a bad response to medicines in the past.  If you get this shot within 3 months of getting a live-virus vaccine, the vaccine may not work. Your doctor will tell you if you need more vaccine.  Check with your doctor or pharmacist before you use any other medicines. This includes over-the-counter medicines. Make sure your doctor knows all of the medicines, vitamins, herbs, and supplements you take. Taking some medicines at the same time can cause problems.  Where can you learn more?  Go to https://www.healthwise.net/patiented  Enter V615 in the search box to learn more about  "\"Learning About Rh Immunoglobulin Shots.\"  Current as of: July 11, 2023               Content Version: 13.8    7341-2476 [facility].   Care instructions adapted under license by your healthcare professional. If you have questions about a medical condition or this instruction, always ask your healthcare professional. [facility] disclaims any warranty or liability for your use of this information.      Rubella (G[place]adian Measles): Care Instructions  Overview  Rubella, also called [place] measles or 3-day measles, is a disease caused by a virus. It spreads by coughs, sneezes, and close contact. Rubella usually is mild and does not cause long-term problems. But if you are pregnant and get it, you can give the disease to your unborn baby. This can cause serious birth defects.  While you have rubella, you may get a rash and a mild fever, and the lymph glands in your neck may swell. Older children often have a fever, eye pain, a sore throat, and body aches. You can relieve most symptoms with care at home. Avoid being around others, especially pregnant people, until your rash has been gone for at least 4 days. People who have not had this disease before or have not had the vaccine have the greatest chance of getting the virus.  Follow-up care is a key part of your treatment and safety. Be sure to make and go to all appointments, and call your doctor if you are having problems. It's also a good idea to know your test results and keep a list of the medicines you take.  How can you care for yourself at home?  Drink plenty of fluids. If you have kidney, heart, or liver disease and have to limit fluids, talk with your doctor before you increase the amount of fluids you drink.  Get plenty of rest to help your body heal.  Take an over-the-counter pain medicine, such as acetaminophen (Tylenol), ibuprofen (Advil, Motrin), or naproxen (Aleve), to reduce fever and discomfort. Read and follow all instructions on " "the label. Do not give aspirin to anyone younger than 20. It has been linked to Reye syndrome, a serious illness.  Do not take two or more pain medicines at the same time unless the doctor told you to. Many pain medicines have acetaminophen, which is Tylenol. Too much acetaminophen (Tylenol) can be harmful.  Try not to scratch the rash. Put cold, wet cloths on the rash to reduce itching.  Do not smoke. Smoking can make your symptoms worse. If you need help quitting, talk to your doctor about stop-smoking programs and medicines. These can increase your chances of quitting for good.  Avoid contact with people who have never had rubella and who have not been immunized.  When should you call for help?   Call your doctor now or seek immediate medical care if:    You have a fever with a stiff neck or a severe headache.     You are sensitive to light or feel very sleepy or confused.   Watch closely for changes in your health, and be sure to contact your doctor if:    You do not get better as expected.   Where can you learn more?  Go to https://www.[employer].net/patiented  Enter B812 in the search box to learn more about \"Rubella (Trinidadian Measles): Care Instructions.\"  Current as of: June 13, 2023               Content Version: 13.8    8176-5885 [facility].   Care instructions adapted under license by your healthcare professional. If you have questions about a medical condition or this instruction, always ask your healthcare professional. [facility] disclaims any warranty or liability for your use of this information.      Gonorrhea and Chlamydia: About These Tests  What is it?  These tests use a sample of urine or other body fluid to look for the bacteria that cause these sexually transmitted infections (STIs). The fluid sample can come from the cervix, vagina, rectum, throat, or eyes.  Why is this test done?  These tests may be done to:  Find out if symptoms are caused by gonorrhea or " "chlamydia.  Check people who are at high risk of being infected with gonorrhea or chlamydia.  Retest people several months after they have been treated for gonorrhea or chlamydia.  Check for infection in your  if you had a gonorrhea or chlamydia infection at the time of delivery.  How can you prepare for the test?  If you are going to have a urine test, do not urinate for at least 1 hour before the test.  If you think you may have chlamydia or gonorrhea, don't have sexual intercourse until you get your test results. And you may want to have tests for other STIs, such as HIV.  How is the test done?  For a direct sample, a swab is used to collect body fluid from the cervix, vagina, rectum, throat, or eyes. Your doctor may collect the sample. Or you may be given instructions on how to collect your own sample.  For a urine sample, you will collect the urine that comes out when you first start to urinate. Don't wipe the genital area clean before you urinate.  How long does the test take?  The test will take a few minutes.  What happens after the test?  You will be able to go home right away.  You can go back to your usual activities right away.  If you do have an infection, don't have sexual intercourse for 7 days after you start treatment. And your sex partner(s) should also be treated.  Follow-up care is a key part of your treatment and safety. Be sure to make and go to all appointments, and call your doctor if you are having problems. It's also a good idea to keep a list of the medicines you take. Ask your doctor when you can expect to have your test results.  Where can you learn more?  Go to https://www.[employer].net/patiented  Enter K976 in the search box to learn more about \"Gonorrhea and Chlamydia: About These Tests.\"  Current as of: 2023               Content Version: 13.8    5836-7455 [employer].   Care instructions adapted under license by your healthcare professional. If you " have questions about a medical condition or this instruction, always ask your healthcare professional. Healthwise, [facility] disclaims any warranty or liability for your use of this information.      Trichomoniasis: About This Test  What is it?     This test uses a sample of urine or other body fluid to look for the tiny parasite that causes trichomoniasis (also called trich). The fluid sample can come from the vagina, cervix, or urethra. Your doctor may choose to use one or more of many available tests.  Why is it done?  A trich test may be done to:  Find out if symptoms are caused by trich.  Check people who are at high risk for being infected with trich.  Check after treatment to make sure that the infection is gone.  How do you prepare for the test?  If you are going to have a urine test, do not urinate for at least 1 hour before the test.  How is the test done?  For a direct sample, a swab is used to collect body fluid from the cervix, vagina, or urethra. Your doctor may collect the sample. Or you may be given instructions on how to collect your own sample.  For a urine sample, you will collect the urine that comes out when you first start to urinate. Don't wipe the area clean before you urinate.  How long does the test take?  It will take a few minutes to collect a sample.  What happens after the test?  You can go home right away.  You can go back to your usual activities right away.  You may get the test results the same day or several days later. It depends on the test used.  If you do have an infection, don't have sexual intercourse for 7 days after you start treatment. Your sex partner(s) should also be treated.  Follow-up care is a key part of your treatment and safety. Be sure to make and go to all appointments, and call your doctor if you are having problems. Ask your doctor when you can expect to have your test results.  Current as of: April 19, 2023               Content Version: 13.8    2641-5555  "[employer].   Care instructions adapted under license by your healthcare professional. If you have questions about a medical condition or this instruction, always ask your healthcare professional. [employer] disclaims any warranty or liability for your use of this information.      HIV Testing: Care Instructions  Overview  You can get tested for the human immunodeficiency virus (HIV). Most doctors use a blood test to check for HIV antibodies and antigens in your blood. It may also check for the genetic material (RNA) of HIV. Some tests use saliva to check for HIV antibodies. But these aren't as accurate. For example, they may give a false result if you've just been infected.  What do the results mean?    Normal (negative)    No HIV antibodies, antigens, or RNA were found.  You may need more testing. It can make sure your test results are correct.    Uncertain (indeterminate)    Test results didn't clearly show if you have an HIV infection.  HIV antibodies or antigens may not have formed yet.  Some other type of antibody or antigen may have affected the results.  You will need another test to be sure.    Abnormal (positive)    HIV antibodies, antigens, or RNA were found.  If you haven't had an RNA test yet, one will be done. If it's positive, you have HIV.  If your test result is positive, your doctor will talk to you. You will discuss starting treatment.  Follow-up care is a key part of your treatment and safety. Be sure to make and go to all appointments, and call your doctor if you are having problems. It's also a good idea to know your test results and keep a list of the medicines you take.  Where can you learn more?  Go to [email or web address]/[email or web address]  Enter T792 in the search box to learn more about \"HIV Testing: Care Instructions.\"  Current as of: June 13, 2023               Content Version: 13.8 2006-2023 [employer].   Care instructions adapted under " license by your healthcare professional. If you have questions about a medical condition or this instruction, always ask your healthcare professional. [employer] disclaims any warranty or liability for your use of this information.      Hepatitis C Virus Tests: About These Tests  What are they?     Hepatitis C virus tests are blood tests that check for substances in the blood that show whether you have hepatitis C now or had it in the past. The tests can also tell you what type of hepatitis C you have and how severe the disease is. This can help your doctor with treatment.  If the tests show that you have long-term hepatitis C, you need to take steps to prevent spreading the disease.  Why are these tests done?  You may need these tests if:  You have symptoms of hepatitis.  You may have been exposed to the virus. You are more likely to have been exposed to the virus if you inject drugs or are exposed to body fluids (such as if you are a health care worker).  You've had other tests that show you have liver problems.  You are 18 to 79 years old.  You have an HIV infection.  The tests also are done to help your doctor decide about your treatment and see how well it works.  How do you prepare for the test?  In general, there's nothing you have to do before this test, unless your doctor tells you to.  How is the test done?  A health professional uses a needle to take a blood sample, usually from the arm.  What happens after these tests?  You will probably be able to go home right away.  You can go back to your usual activities right away.  Follow-up care is a key part of your treatment and safety. Be sure to make and go to all appointments, and call your doctor if you are having problems. It's also a good idea to keep a list of the medicines you take. Ask your doctor when you can expect to have your test results.  Where can you learn more?  Go to https://[email or web address]/[email or web address]  Enter W551 in the search  "box to learn more about \"Hepatitis C Virus Tests: About These Tests.\"  Current as of: June 13, 2023               Content Version: 13.8    6424-4975 [employer].   Care instructions adapted under license by your healthcare professional. If you have questions about a medical condition or this instruction, always ask your healthcare professional. [employer] disclaims any warranty or liability for your use of this information.      Learning About Fetal Ultrasound Results  What is a fetal ultrasound?     Fetal ultrasound is a test that lets your doctor see an image of your baby. Your doctor learns information about your baby from this picture. You may find out, for example, if you are having a boy or a girl. But the main reason you have this test is to get information about your baby's health.  (You may hear your baby called a fetus. This is a common medical term for a baby that's growing in the mother's uterus.)  What kind of information can you learn from this test?  The findings of an ultrasound fall into two categories, normal and abnormal.  Normal  The fetus is the right size for its age.  The placenta is the expected size and does not cover the cervix.  There is enough amniotic fluid in the uterus.  No birth defects can be seen.  Abnormal  The fetus is small or large for its age.  The placenta covers the cervix.  There is too much or too little amniotic fluid in the uterus.  The fetus may have a birth defect.  What does an abnormal result mean?  Abnormal seems to imply that something is wrong with your baby. But what it means is that the test has shown something the doctor wants to take a closer look at.  And that's what happens next. Your doctor will talk to you about what further test or tests you may need.  What do the results mean?  Some of the things your doctor may see on an abnormal ultrasound include:  Echogenic bowel.  The bowel looks very bright on the screen. This could mean " that there's blood in the bowel. Or it could mean that something is blocking the small bowel.  Increased nuchal translucency.  The ultrasound measures the thickness at the back of the baby's neck. An increase in thickness is sometimes an early sign of Down syndrome.  Increased or decreased amniotic fluid.  The doctor will look for a reason for the level of amniotic fluid and will watch the pregnancy closely as it progresses.  Large ventricles.  Ventricles in the brain look larger than they should. Your doctor may take a closer look at the brain.  Renal pyelectasis/hydronephrosis.  The ultrasound measures the fluid around the kidney. If there is more fluid than expected, there is a chance of urinary tract or kidney problems.  Short long bones.  The ultrasound measures certain arm and leg bones. A long bone (humerus or femur) that is shorter than average could be a sign of Down syndrome.  Subchorionic hemorrhage.  An ultrasound can show bleeding under one of the membranes that surrounds the fetus. Some women don't have symptoms of bleeding. The ultrasound can find this problem when women are not bleeding from their vagina. Women who have this condition have a slightly higher chance of miscarriage.  What do you do now?  Take a deep breath, and let it out. Keep in mind that an abnormal finding on an ultrasound, after it's coupled with more information, may:  Turn out to be nothing.  Turn out to be something mild that won't affect the baby.  Turn out to be something more serious. But if this happens, early diagnosis helps you and your doctor plan treatment options sooner rather than later.  Your medical team is there for you. So are your family and friends. Ask questions, and get the help and support you need.  Follow-up care is a key part of your treatment and safety. Be sure to make and go to all appointments, and call your doctor if you are having problems. It's also a good idea to know your test results and keep a  "list of the medicines you take.  Where can you learn more?  Go to https://www.[employer].net/patiented  Enter K451 in the search box to learn more about \"Learning About Fetal Ultrasound Results.\"  Current as of: July 11, 2023               Content Version: 13.8    0182-6053 [employer].   Care instructions adapted under license by your healthcare professional. If you have questions about a medical condition or this instruction, always ask your healthcare professional. [employer] disclaims any warranty or liability for your use of this information.      Learning About Prenatal Visits  Overview     Regular prenatal visits are very important during any pregnancy. These quick office visits may seem simple and routine. But they can help you have a safe and healthy pregnancy. Your doctor is watching for problems that can only be found through regular checkups. The visits also give you and your doctor time to build a good relationship.  It's common to see your doctor every 4 weeks until week 28 of pregnancy. Then the visits will happen more often. From weeks 28 to 36, it's common to have visits every 2 to 3 weeks. In the final month of pregnancy, you likely will see your doctor every week. Your doctor may want to see you more or less often, depending on your health, your age, and if you've had a normal, full-term pregnancy before.  At different times in your pregnancy, you will have exams and tests. Some are routine. Others are done only when there is a chance of a problem. Everything healthy you do for your body helps you have a healthy pregnancy. Rest when you need it. Eat well, drink plenty of water, and exercise regularly.  What happens during a prenatal visit?  You will have blood pressure checks, along with urine tests. You also may have blood tests. If you need to go to the bathroom while waiting for the doctor, tell the nurse. You will be given a sample cup so your urine can be " tested.  You will be weighed and have your belly measured.  Your doctor may listen to the fetal heartbeat with a special device.  At about 24 weeks, and possibly earlier in your pregnancy, your doctor will check your blood sugar (glucose tolerance test) for diabetes that can occur during pregnancy. This is gestational diabetes, which can be harmful.  You will have tests to check for infections that could harm your . These include group B streptococcus and hepatitis B.  Your doctor may do ultrasounds to check for problems. This also checks the position of the fetus. An ultrasound uses sound waves to produce a picture of the fetus.  You may get your vaccines updated.  Your doctor may ask you questions to check for signs of anxiety or depression. Tell your doctor if you feel sad, anxious, or hopeless for more than a few days.  You may have other tests at any time during your pregnancy.  Use your visits to discuss with your doctor any concerns you have.  How can you care for yourself at home?  Get plenty of rest.  Try to exercise every day, if your doctor says it is okay. If you have not exercised in the past, start out slowly. For example, you can take short walks each day.  Choose healthy foods, such as fruits, vegetables, whole grains, lean proteins, low-fat dairy, and healthy fats.  Drink plenty of fluids. Cut down on drinks with caffeine, such as coffee, tea, and cola. If you have kidney, heart, or liver disease and have to limit fluids, talk with your doctor before you increase the amount of fluids you drink.  Try to avoid chemical fumes, paint fumes, and poisons.  If you smoke, vape, or use alcohol, marijuana, or other drugs, quit or cut back as much as you can. Talk to your doctor if you need help quitting.  Review all of your medicines, including over-the-counter medicines and supplements, with your doctor. Some of your routine medicines may need to be changed. Do not stop or start taking any medicines  "without talking to your doctor first.  Follow-up care is a key part of your treatment and safety. Be sure to make and go to all appointments, and call your doctor if you are having problems. It's also a good idea to know your test results and keep a list of the medicines you take.  Where can you learn more?  Go to [email or web address]/patiented  Enter J502 in the search box to learn more about \"Learning About Prenatal Visits.\"  Current as of: July 11, 2023               Content Version: 13.8    4851-0377 [employer].   Care instructions adapted under license by your healthcare professional. If you have questions about a medical condition or this instruction, always ask your healthcare professional. [employer] disclaims any warranty or liability for your use of this information.      Intimate Partner Violence: Care Instructions  Overview     If you want to save this information but don't think it is safe to take it home, see if a trusted friend can keep it for you. Plan ahead. Know who you can call for help, and memorize the phone number.   Be careful online too. Your online activity may be seen by others. Do not use your personal computer or device to read about this topic. Use a safe computer, such as one at work, a friend's home, or a library.    Intimate partner violence--a type of domestic abuse--is different from an argument now and then. It is a pattern of abuse that one person may use to control another person's behavior. It may start with threats and name-calling. Then, it may lead to more serious acts, like pushing and slapping. The abuse also may occur in other areas. For example, the abuser may withhold money or spend a partner's money without their knowledge.  Abuse can cause serious harm. You are more likely to have a long-term health problem from the injuries and stress of living in a violent relationship. People who are sexually abused by their partners have more " sexually transmitted infections and unplanned pregnancies. Anyone who is abused also faces emotional pain. Anyone can be abused in relationships. In some relationships, both people use abusive behavior.  If you are pregnant, abuse can cause problems such as poor weight gain, infections, and bleeding. Abuse during this time may increase your baby's risk of low birth weight, premature birth, and death.  Follow-up care is a key part of your treatment and safety. Be sure to make and go to all appointments, and call your doctor if you are having problems. It's also a good idea to know your test results and keep a list of the medicines you take.  How can you care for yourself at home?  If you do not have a safe place to stay, discuss this with your doctor before you leave.  Have a plan for where to go, how to leave your home, and where to stay in case of an emergency. Do not tell your partner about your plan. Contact:  The National Domestic Violence Hotline toll-free at 1-785.592.2831. They can help you find resources in your area.  Your local police department, hospital, or clinic for information about shelters and safe homes near you.  Talk to a trusted friend or neighbor, a counselor, or a anne leader. Do not feel that you have to hide what happened.  Teach your children how to call for help in an emergency.  Be alert to warning signs, such as threats, heavy alcohol use, or drug use. This can help you avoid danger.  If you can, make sure that there are no guns or other weapons in your home.  When should you call for help?   Call 911 anytime you think you may need emergency care. For example, call if:    You or someone else has just been abused.     You think you or someone else is in danger of being abused.   Watch closely for changes in your health, and be sure to contact your doctor if you have any problems.  Where can you learn more?  Go to https://www.healthwise.net/patiented  Enter G282 in the search box to learn  "more about \"Intimate Partner Violence: Care Instructions.\"  Current as of: June 25, 2023               Content Version: 13.8    7174-6509 [employer].   Care instructions adapted under license by your healthcare professional. If you have questions about a medical condition or this instruction, always ask your healthcare professional. [employer] disclaims any warranty or liability for your use of this information.      Intimate Partner Violence Safety Instructions: Care Instructions  Overview     If you want to save this information but don't think it is safe to take it home, see if a trusted friend can keep it for you. Plan ahead. Know who you can call for help, and memorize the phone number.   Be careful online too. Your online activity may be seen by others. Do not use your personal computer or device to read about this topic. Use a safe computer, such as one at work, a friend's home, or a library.    When you are abused by a spouse or partner, you can take actions to protect yourself and your children.  You can increase your safety whether you decide to stay with your spouse or partner or you decide to leave. You may want to make a safety plan and pack a bag ahead of time. This will help you leave quickly when you decide to. Remember, you cannot change your partner's actions, but you can find help for you and your children. No one deserves to be abused.  Follow-up care is a key part of your treatment and safety. Be sure to make and go to all appointments, and call your doctor if you are having problems. It's also a good idea to know your test results and keep a list of the medicines you take.  How can you care for yourself at home?  Make a plan for your safety   If you decide to stay with your abusive spouse or partner, you can do the following to increase your safety:  Decide what works best to keep you safe in an emergency.  Know who you can call to help you in an emergency.  Decide " if you will call the police if you get hurt again. If you can, agree on a signal with your children or neighbor to call the police for you if you need help. You can flash lights or hang something out of a window.  Choose a safe place to go for a short time if you need to leave home. Memorize the address and phone number.  Learn escape routes out of your home in case you need to leave in a hurry. Teach your children different ways to get out of your home quickly if they need to.  If you can, hide or lock up things that can be used as weapons (guns, knives, hammers).  Learn the number of a domestic violence shelter. Talk to the people there about how they can help.  Find out about other community resources that can help you.  Take pictures of bruises or other injuries if you can. You can also take pictures of things your abuser has broken.  Teach your children that violence is never okay. Tell them that they do not deserve to be hurt.  Pack a bag   Prepare a bag with things you will need if you leave suddenly. Leave it with a friend, a relative, or someone else you trust. You could include the following items in the bag:  A set of keys to your home and car.  Emergency phone numbers and addresses.  Money such as cash or checks. You can also ask a friend, a relative, or someone else you trust to hold money for you.  Copies of legal documents such as house and car titles or rent receipts, birth certificates, Social Security card, voter registration, marriage and 's licenses, and your children's health records.  Personal items you would need for a few days, such as clothes, a toothbrush, toothpaste, and any medicines you or your children need.  A favorite toy or book for your child or children.  Diapers and bottles, if you have very young children.  Pictures that show signs of abuse and violence. You may also add pictures of your abuser.  If you leave   If you decide to leave, you can take the following steps:  Go  "to the emergency room at a hospital if you have been hurt.  Think about asking the police to be with you as you leave. They can protect you as you leave your home.  If you decide to leave secretly, remember that activities can be tracked. Your abuser may still have access to your cell phone, email, and credit cards. It may be possible for these to be traced. Always be aware of your surroundings.  If this is an emergency, do not worry about gathering up anything. Just leave--your safety is most important.  If your abuser moves out, change the locks on the doors. If you have a security system, change the access code.  When should you call for help?   Call 911 anytime you think you may need emergency care. For example, call if:    You or someone else has just been abused.     You think you or someone else is in danger of being abused.   Watch closely for changes in your health, and be sure to contact your doctor if you have any problems.  Where can you learn more?  Go to https://www.Ch[employer]ik.net/patiented  Enter A752 in the search box to learn more about \"Intimate Partner Violence Safety Instructions: Care Instructions.\"  Current as of: June 25, 2023               Content Version: 13.8    8065-2951 [employer].   Care instructions adapted under license by your healthcare professional. If you have questions about a medical condition or this instruction, always ask your healthcare professional. [employer] disclaims any warranty or liability for your use of this information.      Learning About Intimate Partner Violence  What is intimate partner violence?  Intimate partner violence is a type of domestic abuse. It's threatening, emotionally harmful, or violent behavior in a personal relationship. It can happen between past or current partners or spouses. In some relationships both people abuse each other. One partner may be more abusive. Or the abuse may be equal.  Abuse can affect people of " any ethnic group, race, or Latter-day. It can affect teens, adults, or the elderly. And it can happen to people of any sexual orientation, gender, or social status.  Abusers use fear, bullying, and threats to control their partners. They may control what their partners do. They may control where their partners go or who they see. They may act jealous, controlling, or possessive. These early signs of abuse may happen soon after the start of the relationship. Sometimes it can be hard to notice abuse at first. But after the relationship becomes more serious, the abuse may get worse.  If you are being abused in your relationship, it's important to get help. The abuse is not your fault. You don't have to face it alone.  Be careful  It may not be safe to take home domestic abuse information like this handout. Some people ask a trusted friend to keep it for them. It's also important to plan ahead and to memorize the phone number of places you can go for help. If you are concerned about your safety, do not use your computer, smartphone, or tablet to read about domestic abuse.   What are the types of intimate partner violence?  Abuse can happen in different ways. Each type can happen on its own or in combination with others.  Emotional abuse  Emotional abuse is a pattern of threats, insults, or controlling behavior. It includes verbal abuse. It goes beyond healthy disagreements in a relationship. It's a sign of an unhealthy relationship.  Do you feel threatened, intimidated, or controlled?  Does your partner:  Threaten your children, other family members, or pets?  Use jokes meant to embarrass or shame you?  Call you names?  Tell you that you are a bad parent?  Threaten to take away your children?  Threaten to have you or your family members deported?  Control your access to money or other basic needs?  Control what you do, who you see or talk to, or where you go?  Another form of emotional abuse is denying that it is  happening. Or the abuser may act like the abuse is no big deal or is your fault.  Sexual abuse  With sexual abuse, abusers may try to convince or force you to have sex. They may force you into sex acts you're not comfortable with. Or they may sexually assault you. Sexual abuse can happen even if you are in a committed relationship.  Physical abuse  Physical abuse means that a partner hits, kicks, or does something else to physically hurt you. Physical abuse that starts with a slap might lead to kicking, shoving, and choking over time. The abuser may also threaten to hurt or kill you.  Stalking  Stalking means that an abuser gives you attention that you do not want and that causes you fear. Examples of stalking include:  Following you.  Showing up at places where the abuser isn't invited, such as at your work or school.  Constantly calling or texting you.  What problems can  to?  Intimate partner violence can be very dangerous. It can cause serious, repeated injury. It can even lead to death.  All forms of abuse can cause long-term health problems from the stress of a violent relationship. Verbal abuse can lead to sexual and physical abuse.  Abuse causes:  Emotional pain.  Depression.  Anxiety.  Post-traumatic stress.  Sexual abuse can lead to sexually transmitted infections (such as HIV/AIDS) and unplanned pregnancy.  Pregnancy can be a very dangerous time for people in abusive relationships. Abuse can cause or increase the risk of problems during pregnancy. These include low weight gain, anemia, infections, and bleeding. Abuse may also increase your baby's risk of low birth weight, premature birth, and death.  It can be hard for some victims of abuse to ask for help or to leave their relationship. You may feel scared, stuck, or not sure what steps to take. But it's important not to ignore abuse. Talking to someone you trust could be the first step to ending the abuse and taking care of your own health and  "happiness again. There are resources available that can help keep you safe.  Where can you get help?  Talk to a trusted friend. Find a local advocacy group, or talk to your doctor about the abuse.  Contact the National Domestic Violence Hotline at 5-150-599-PWQP (1-847.359.8398) for more safety tips. They can guide you to groups in your area that can help. Or go to the National Coalition Against Domestic Violence website at www.[employer].org to learn more.  Domestic violence groups or a counselor in your area can help you make a safety plan for yourself and your children.  When to call for help  Call 911 anytime you think you may need emergency care. For example, call if:  You think that you or someone you know is in danger of being abused.  You have been hurt and can't have someone safely take you to emergency care.  You have just been abused.  A family member has just been abused.  Where can you learn more?  Go to https://www."[employer].net/patiented  Enter S665 in the search box to learn more about \"Learning About Intimate Partner Violence.\"  Current as of: June 25, 2023               Content Version: 13.8    6347-7154 [employer].   Care instructions adapted under license by your healthcare professional. If you have questions about a medical condition or this instruction, always ask your healthcare professional. [employer] disclaims any warranty or liability for your use of this information.      Vaginal Bleeding During Pregnancy: Care Instructions  Overview     It's common to have some vaginal spotting when you are pregnant. In some cases, the bleeding isn't serious. And there aren't any more problems with the pregnancy.  But sometimes bleeding is a sign of a more serious problem. This is more common if the bleeding is heavy or painful. Examples of more serious problems include miscarriage, an ectopic pregnancy, and a problem with the placenta.  You may have to see your doctor again " to be sure everything is okay. You may also need more tests to find the cause of the bleeding.  Home treatment may be all you need. But it depends on what is causing the bleeding. Be sure to tell your doctor if you have any new symptoms or if your symptoms get worse.  The doctor has checked you carefully, but problems can develop later. If you notice any problems or new symptoms, get medical treatment right away.  Follow-up care is a key part of your treatment and safety. Be sure to make and go to all appointments, and call your doctor if you are having problems. It's also a good idea to know your test results and keep a list of the medicines you take.  How can you care for yourself at home?  If your doctor prescribed medicines, take them exactly as directed. Call your doctor if you think you are having a problem with your medicine.  Do not have vaginal sex until your doctor says it's okay.  Do not put anything in your vagina until your doctor says it's okay.  Ask your doctor about other activities you can or can't do.  Get a lot of rest. Being pregnant can make you tired.  Do not use nonsteroidal anti-inflammatory drugs (NSAIDs), such as ibuprofen (Advil, Motrin), naproxen (Aleve), or aspirin, unless your doctor says it is okay.  When should you call for help?   Call 911 anytime you think you may need emergency care. For example, call if:    You passed out (lost consciousness).     You have severe vaginal bleeding. This means you are soaking through a pad each hour for 2 or more hours.     You have sudden, severe pain in your belly or pelvis.   Call your doctor now or seek immediate medical care if:    You have new or worse vaginal bleeding.     You are dizzy or lightheaded, or you feel like you may faint.     You have pain in your belly, pelvis, or lower back.     You think that you are in labor.     You have a sudden release of fluid from your vagina.     You've been having regular contractions for an hour. This  "means that you've had at least 8 contractions within 1 hour or at least 4 contractions within 20 minutes, even after you change your position and drink fluids.     You notice that your baby has stopped moving or is moving much less than normal.   Watch closely for changes in your health, and be sure to contact your doctor if you have any problems.  Where can you learn more?  Go to https://www.[employer]Cache.Striped Sail/patiented  Enter N829 in the search box to learn more about \"Vaginal Bleeding During Pregnancy: Care Instructions.\"  Current as of: July 11, 2023               Content Version: 13.8    4885-8428 [employer].   Care instructions adapted under license by your healthcare professional. If you have questions about a medical condition or this instruction, always ask your healthcare professional. [employer] disclaims any warranty or liability for your use of this information.      Weeks 10 to 14 of Your Pregnancy: Care Instructions  It's now possible to hear the fetus's heartbeat with a special ultrasound device. And the fetus's organs are developing.    Decide about tests to check for birth defects. Think about your age, your chance of passing on a family disease, your need to know about any problems, and what you might do after you have the test results.   It's okay to exercise. Try activities such as walking or swimming. Check with your doctor before starting a new program.     You may feel more tired than usual.  Taking naps during the day may help.     You may feel emotional.  It might help to talk to someone.     You may have headaches.  Try lying down and putting a cool cloth over your forehead.     You can use acetaminophen (Tylenol) for pain relief.  Don't take any anti-inflammatory medicines (such as Advil, Motrin, Aleve), unless your doctor says it's okay.     You may feel a fullness or aching in your lower belly.  This can feel like the kind of cramps you might get before a period. " "A back rub may help.     You may need to urinate more.  Your growing uterus and changing hormones can affect your bladder.     You may feel sick to your stomach (morning sickness).  Try avoiding food and smells that make you feel sick.     Your breasts may feel different.  They may feel tender or get bigger. Your nipples may get darker. Try a bra that gives you good support.     Avoid alcohol, tobacco, and drugs (including marijuana).  If you need help quitting, talk to your doctor.     Take a daily prenatal vitamin.  Choose one with folic acid.   Follow-up care is a key part of your treatment and safety. Be sure to make and go to all appointments, and call your doctor if you are having problems. It's also a good idea to know your test results and keep a list of the medicines you take.  Where can you learn more?  Go to https://www.[employer].net/patiented  Enter E090 in the search box to learn more about \"Weeks 10 to 14 of Your Pregnancy: Care Instructions.\"  Current as of: July 11, 2023               Content Version: 13.8    6065-7549 [employer].   Care instructions adapted under license by your healthcare professional. If you have questions about a medical condition or this instruction, always ask your healthcare professional. [employer] disclaims any warranty or liability for your use of this information.      Understanding Alpha and Beta Thalassemia  What is thalassemia?  [place] [place] is a lifelong blood disorder. It is caused by mutations (changes) in the genes that make hemoglobin.   Hemoglobin is a protein in red blood cells that carries oxygen. Hemoglobin is made of 4 smaller protein chains: 2 blocks of alpha chains and 2 blocks of beta chains (see Figure 1). Each block has heme (iron) in the center. Oxygen sticks to the heme, allowing your body to carry it through the bloodstream. The oxygen is delivered to your whole body.  When you have alpha thalassemia, " your alpha blocks are smaller or are missing one or both alpha chains. (See Figure 2 for an example.) For beta thalassemia, the beta blocks are smaller or fewer in number.   With either disorder, your body makes smaller hemoglobin and possibly fewer red blood cells to hold hemoglobin (anemia). You may feel very tired or have other problems as a result.  How do you get thalassemia?  There are 4 genes for alpha thalassemia and 2 genes for beta thalassemia. For you to have either alpha or beta thalassemia, both of your parents must carry a gene mutation for the disease and pass it down to you.   It's possible to have more severe or less severe symptoms than your parents. If you get a mutation from only one parent, for example, you won't have symptoms of thalassemia (thalassemia trait)--but you could still pass the mutation to your children.  Types of thalassemias  Some types of thalassemia have fewer symptoms than others. How severe the thalassemia is depends on how many mutated genes you have inherited and how many alpha or beta blocks are affected.   Alpha Thalassemia  Silent carrier (1 alpha mutation): People with this type have no symptoms and often do not know they have thalassemia.  Alpha thalassemia trait (2 alpha mutations): Some people with this trait may have mild anemia, but they often feel well.  Hemoglobin H disease (3 alpha mutations): People with this disorder have anemia more often. They sometimes need blood transfusions, but can have a normal life span.  Hemoglobin Barts (4 alpha mutations):  With this type, no alpha blocks are made. Severe problems occur during the mother's pregnancy and newborns rarely survive.  Beta Thalassemia  Beta thalassemia trait (1 beta mutation): People with this trait (also called beta thalassemia minor) have red blood cells that are small. Mild anemia can occur, but it is rare.  Beta thalassemia intermedia (2 beta mutations): In this type, both beta genes are abnormal, but  these mutations may be mild. More severe types sometimes need blood transfusions to treat anemia and medicine to treat iron buildup. Patients have normal life spans.  Beta thalassemia major (2 severe beta mutations): This is the most severe form of beta thalassemia. Both beta genes are abnormal, causing little to no beta blocks to be made. Patients often need medicine to reduce iron buildup, as well as monthly blood transfusions to stay healthy. The only cure at this time is a bone marrow transplant. More options are still being studied.  Treatment and outcomes  Mild types: People with a mild type of alpha or beta thalassemia rarely need treatment. Some need folic acid to help make more red blood cells. Most have normal life spans.  Moderate to severe types: Patients with these types have a higher risk for reduced growth, early bone thinning and pregnancy problems.  Most severe types: These patients often need regular blood transfusions, even if not sick. It is common to have iron build up in your body, so medicine may be needed to reduce the buildup. If left untreated, too much iron, especially in the liver and heart, can lead to premature death.  Outcomes for patients with alpha or beta thalassemia are usually very good in developed countries like the [place]. Survival rates higher than 90% have been reported for children.   For informational purposes only. Not to replace the advice of your health care provider. Copyright   2021 [facility]. All rights reserved. Clinically reviewed by Taz Kapoor MD, Hematology. iMOSPHERE [id number] - REV 08/21.    Nutrition During Pregnancy: Care Instructions  Overview     Healthy eating when you are pregnant is important for you and your baby. It can help you feel well and have a successful pregnancy and delivery. During pregnancy your nutrition needs increase. Even if you have excellent eating habits, your doctor may recommend a multivitamin to make  sure you get enough iron and folic acid.  You may wonder how much weight you should gain. In general, if you were at a healthy weight before you became pregnant, then you should gain between 25 and 35 pounds. If you were overweight before pregnancy, then you'll likely be advised to gain 15 to 25 pounds. If you were underweight before pregnancy, then you'll probably be advised to gain 28 to 40 pounds. Your doctor will work with you to set a weight goal that is right for you. Gaining a healthy amount of weight helps you have a healthy baby.  Follow-up care is a key part of your treatment and safety. Be sure to make and go to all appointments, and call your doctor if you are having problems. It's also a good idea to know your test results and keep a list of the medicines you take.  How can you care for yourself at home?  Eat plenty of fruits and vegetables. Include a variety of orange, yellow, and leafy dark-green vegetables every day.  Choose whole-grain bread, cereal, and pasta. Good choices include whole wheat bread, whole wheat pasta, brown rice, and oatmeal.  Get 4 or more servings of milk and milk products each day. Good choices include nonfat or low-fat milk, yogurt, and cheese. If you cannot eat milk products, you can get calcium from calcium-fortified products such as orange juice, soy milk, and tofu. Other non-milk sources of calcium include leafy green vegetables, such as broccoli, kale, mustard greens, turnip greens, bok katina, and brussels sprouts.  If you eat meat, pick lower-fat types. Good choices include lean cuts of meat and chicken or turkey without the skin.  Do not eat shark, swordfish, [place] mackerel, or tilefish. They have high levels of mercury, which is dangerous to your baby. You can eat up to 12 ounces a week of fish or shellfish that have low mercury levels. Good choices include shrimp, wild salmon, pollock, and catfish. Limit some other types of fish, such as white (albacore) tuna, to 4 oz  "(0.1 kg) a week.  Heat lunch meats (such as turkey, ham, or bologna) to 165 F before you eat them. This reduces your risk of getting sick from a kind of bacteria that can be found in lunch meats.  Do not eat unpasteurized soft cheeses, such as brie, feta, fresh mozzarella, and blue cheese. They have a bacteria that could harm your baby.  Limit caffeine. If you drink coffee or tea, have no more than 1 cup a day. Caffeine is also found in carlene.  Do not drink any alcohol. No amount of alcohol has been found to be safe during pregnancy.  Do not diet or try to lose weight. For example, do not follow a low-carbohydrate diet. If you are overweight at the start of your pregnancy, your doctor will work with you to manage your weight gain.  Tell your doctor about all vitamins and supplements you take.  When should you call for help?  Watch closely for changes in your health, and be sure to contact your doctor if you have any problems.  Where can you learn more?  Go to https://www.[employer]Houses.net/patiented  Enter Y785 in the search box to learn more about \"Nutrition During Pregnancy: Care Instructions.\"  Current as of: February 28, 2023               Content Version: 13.8    8806-5872 [employer].   Care instructions adapted under license by your healthcare professional. If you have questions about a medical condition or this instruction, always ask your healthcare professional. [employer] disclaims any warranty or liability for your use of this information.      Exercise During Pregnancy: Care Instructions  Overview     Exercise is good for you during a healthy pregnancy. It can relieve back pain, swelling, and other discomforts. It also prepares your muscles for childbirth. And exercise can improve your energy level and help you sleep better.  If your doctor advises it, get more exercise. For example, walking is a good choice. Bit by bit, increase the amount you walk every day. Try for at least 30 " minutes on most days of the week. You could also try a prenatal exercise class. But if you do not already exercise, be sure to talk with your doctor before you start a new exercise program. Doctors do not recommend contact sports during pregnancy.  Follow-up care is a key part of your treatment and safety. Be sure to make and go to all appointments, and call your doctor if you are having problems. It's also a good idea to know your test results and keep a list of the medicines you take.  How can you care for yourself at home?  Talk with your doctor about the right kind of exercise for each stage of pregnancy.  Listen to your body to know if your exercise is at a safe level.  Do not become overheated while you exercise. High body temperature can be harmful. Avoid activities that can make your body too hot.  If you feel tired, take it easy. You might walk instead of run.  If you are used to strenuous exercise, ask your doctor how to know when it's time to slow down.  If you exercised before getting pregnant, you should be able to keep up your routine early in your pregnancy. Later in your pregnancy, you may want to switch to more gentle activities.  Drink plenty of fluids before, during, and after exercise.  Avoid contact sports, such as soccer and basketball. Also avoid risky activities. These include scuba diving, horseback riding, and exercising at a high altitude (above 6,000 feet). If you live in a place with a high altitude, talk to your doctor about how you can exercise safely.  Do not get overtired while you exercise. You should be able to talk while you work out.  After your fourth month of pregnancy, avoid exercises that require you to lie flat on your back on a hard surface. These include sit-ups and some yoga poses.  Get plenty of rest. You may be very tired while you are pregnant.  Where can you learn more?  Go to https://www.healthwise.net/patiented  Enter S801 in the search box to learn more about  "\"Exercise During Pregnancy: Care Instructions.\"  Current as of: July 11, 2023               Content Version: 13.8    4637-8320 [employer].   Care instructions adapted under license by your healthcare professional. If you have questions about a medical condition or this instruction, always ask your healthcare professional. [employer] disclaims any warranty or liability for your use of this information.      Learning About Pregnancy and Obesity  How does your weight affect your pregnancy?     The basics of prenatal care are the same for everyone, regardless of size. You'll get what you need to have a healthy baby.  But your size can make a difference in a few things. You and your doctor will have to watch your pregnancy weight. Your weight may affect your labor and delivery.  You may have some extra doctor visits and tests. And you may have some tests earlier in your pregnancy. You'll need to pay close attention to things like blood pressure and the chance of getting gestational diabetes. (This is a type of diabetes that sometimes happens during pregnancy.) And close attention will be given to your developing baby.  Work with your doctor to get the care you need. Go to all your doctor visits, and follow your doctor's advice about what to do and what to avoid during pregnancy.  How much weight gain is healthy?  If you are very overweight (obese), experts recommend that you gain between 11 and 20 pounds. Your doctor will work with you to set a weight goal that's right for you. In some cases, your doctor may recommend that you not gain any weight.  How much extra food do you need to eat?  Although you may joke that you're \"eating for two\" during pregnancy, you don't need to eat twice as much food. How much you can eat depends on:  Your height.  How much you weigh when you get pregnant.  How active you are.  If you're carrying more than one fetus (multiple pregnancy).  In the first trimester, " "you'll probably need the same amount of calories as you did before you were pregnant. In general, in your second trimester, you need to eat about 340 extra calories a day. In your third trimester, you need to eat about 450 extra calories a day.  What can you do to have a healthy pregnancy?  The best things you can do for you and your baby are to eat healthy foods, get regular exercise, avoid alcohol and smoking, and go to your doctor visits.  Eat a variety of foods from all the food groups. Make sure to get enough calcium and folic acid.  You may want to work with a dietitian to help you plan healthy meals to get the right amount of calories for you.  If you didn't exercise much before you got pregnant, talk to your doctor about how you can slowly get more active. Your doctor may want to set up an exercise program with you.  Where can you learn more?  Go to https://www.[employer].net/patiented  Enter B644 in the search box to learn more about \"Learning About Pregnancy and Obesity.\"  Current as of: July 11, 2023               Content Version: 13.8    4717-9375 [employer].   Care instructions adapted under license by your healthcare professional. If you have questions about a medical condition or this instruction, always ask your healthcare professional. [employer] disclaims any warranty or liability for your use of this information.      You have been provided the CDC Warning Signs in Pregnancy document.    Additional copies can be found here: www.[employer].com/876628.pdf  "

## 2023-11-08 NOTE — PROGRESS NOTES
MEHRAN RN Prenatal Intake note  Subjective     23 year old female newly pregnant.  LMP: unknown.  US OB on 10/27 w/ GA 10w2d.  unsure  Pregnancy is unplanned but welcomed.    Partner/support person name and relationship - partner/Woody Brady.        Symptoms since LMP include nausea and vomiting, fatigue, weight loss. Patient has tried these relief measures: increased rest and increased fluids. Educated patient on diet changes for nausea, medication use (put in prescription for B6+unisom).    OB HISTORY  OB History    Para Term  AB Living   2 1 0 1 0 0   SAB IAB Ectopic Multiple Live Births   0 0 0 0 1      # Outcome Date GA Lbr Jet/2nd Weight Sex Delivery Anes PTL Lv   2 Current            1  2022       Y ND      Obstetric Comments    - spontaneous  labor and  birth at ~7mo (pt does not know GA). Baby was in NICU (pt does not know for how long) before passing away.          OB COMPLICATIONS  First Pregnancy No  GDM No  HTNNo  Preeclampsia No   labor Yes:  pregnancy, spontaneous   birth Yes:  pregnancy, spontaneous   birth No  PP hemorrhage No  Retained placenta No  PP mood disorder No  Fetal anomaly No  FGR No  Macrosomia  No  3rd or 4th degree laceration  No  Shoulder dystocia No  NICU admit Yes: baby passed away in NICU       PERSONAL/SOCIAL HISTORY  *updated all tabs in history  partnered  lives with their spouse.  Employment: Unemployed.    Her partner is currently unemployed.  Just emig[place] from [facility], does not know about any resources available, unfamiliar with location/how to apply for work, etc. - put in  referral    MENTAL HEALTH HISTORY:  None - PHQ score of  Depression Screening Follow-up        2023     2:24 PM   PHQ   PHQ-9 Total Score 10   Q9: Thoughts of better off dead/self-harm past 2 weeks Not at all       Does the patient currently have a mental health provider?  No  Follow Up Actions Taken  Patient to follow up  with PCP. Clinic staff to schedule appointment if able.  Mental Health Referral pended/placed per pt preference        - Current Medications reviewed.   No current outpatient medications on file.      -Placed prescription for prenatal vitamin.    - Co-morbids reviewed. No past medical history on file.    - Genetic/Infection questionnaire completed, risks include none. Discussed genetic screening options, patient does desire first trimester genetic screening  Pt  does not have a recent known exposure to Parvo or CMV so IgG/IgM testing WILL NOT be ordered.   Flu Vaccine.  Patient declined, do not offer  COVID Vaccine: no hx of covid, received initial vaccines + most recent booster  - Discussed expectations for routine prenatal care and scheduling.  -Discussed highlights from The Expectant Family booklet on warning signs, safe pregnancy and prevention of infections diseases, nutrition and exercise.  - Patient was encouraged to start prenatal vitamins as tolerated, if experiencing nausea and vomiting then OK to switch to folic acid only at this time.    -Additional items: Has been given information regarding WIC, put in SW referral  -Reconciled and reviewed problem list  -Pt scheduled for NOB on 11/13    Charlette Gomez RN

## 2023-11-12 LAB
ABO/RH(D): NORMAL
ANTIBODY SCREEN: NEGATIVE
SPECIMEN EXPIRATION DATE: NORMAL

## 2023-11-13 ENCOUNTER — LAB (OUTPATIENT)
Dept: LAB | Facility: CLINIC | Age: 23
End: 2023-11-13
Attending: STUDENT IN AN ORGANIZED HEALTH CARE EDUCATION/TRAINING PROGRAM

## 2023-11-13 ENCOUNTER — PRENATAL OFFICE VISIT (OUTPATIENT)
Dept: OBGYN | Facility: CLINIC | Age: 23
End: 2023-11-13
Attending: STUDENT IN AN ORGANIZED HEALTH CARE EDUCATION/TRAINING PROGRAM

## 2023-11-13 VITALS — DIASTOLIC BLOOD PRESSURE: 59 MMHG | HEART RATE: 76 BPM | WEIGHT: 113 LBS | SYSTOLIC BLOOD PRESSURE: 90 MMHG

## 2023-11-13 DIAGNOSIS — O09.891 HISTORY OF PRETERM DELIVERY, CURRENTLY PREGNANT IN FIRST TRIMESTER: ICD-10-CM

## 2023-11-13 DIAGNOSIS — O09.91 SUPERVISION OF HIGH RISK PREGNANCY IN FIRST TRIMESTER: Primary | ICD-10-CM

## 2023-11-13 DIAGNOSIS — O09.91 SUPERVISION OF HIGH RISK PREGNANCY IN FIRST TRIMESTER: ICD-10-CM

## 2023-11-13 PROBLEM — O09.211 CURRENT PREGNANCY WITH HISTORY OF PRE-TERM LABOR IN FIRST TRIMESTER: Status: ACTIVE | Noted: 2023-11-13

## 2023-11-13 LAB
ERYTHROCYTE [DISTWIDTH] IN BLOOD BY AUTOMATED COUNT: 15 % (ref 10–15)
HBV SURFACE AG SERPL QL IA: NONREACTIVE
HCT VFR BLD AUTO: 35.9 % (ref 35–47)
HCV AB SERPL QL IA: NONREACTIVE
HGB BLD-MCNC: 12.2 G/DL (ref 11.7–15.7)
HIV 1+2 AB+HIV1 P24 AG SERPL QL IA: NONREACTIVE
MCH RBC QN AUTO: 27.6 PG (ref 26.5–33)
MCHC RBC AUTO-ENTMCNC: 34 G/DL (ref 31.5–36.5)
MCV RBC AUTO: 81 FL (ref 78–100)
PLATELET # BLD AUTO: 288 10E3/UL (ref 150–450)
RBC # BLD AUTO: 4.42 10E6/UL (ref 3.8–5.2)
T PALLIDUM AB SER QL: NONREACTIVE
WBC # BLD AUTO: 6.3 10E3/UL (ref 4–11)

## 2023-11-13 PROCEDURE — 87389 HIV-1 AG W/HIV-1&-2 AB AG IA: CPT

## 2023-11-13 PROCEDURE — 99207 PR PRENATAL VISIT: CPT | Performed by: STUDENT IN AN ORGANIZED HEALTH CARE EDUCATION/TRAINING PROGRAM

## 2023-11-13 PROCEDURE — 87340 HEPATITIS B SURFACE AG IA: CPT

## 2023-11-13 PROCEDURE — 36415 COLL VENOUS BLD VENIPUNCTURE: CPT

## 2023-11-13 PROCEDURE — 86780 TREPONEMA PALLIDUM: CPT

## 2023-11-13 PROCEDURE — 86901 BLOOD TYPING SEROLOGIC RH(D): CPT

## 2023-11-13 PROCEDURE — 86850 RBC ANTIBODY SCREEN: CPT

## 2023-11-13 PROCEDURE — 87591 N.GONORRHOEAE DNA AMP PROB: CPT | Performed by: STUDENT IN AN ORGANIZED HEALTH CARE EDUCATION/TRAINING PROGRAM

## 2023-11-13 PROCEDURE — G0145 SCR C/V CYTO,THINLAYER,RESCR: HCPCS | Performed by: STUDENT IN AN ORGANIZED HEALTH CARE EDUCATION/TRAINING PROGRAM

## 2023-11-13 PROCEDURE — 87088 URINE BACTERIA CULTURE: CPT

## 2023-11-13 PROCEDURE — 86803 HEPATITIS C AB TEST: CPT

## 2023-11-13 PROCEDURE — 87491 CHLMYD TRACH DNA AMP PROBE: CPT | Performed by: STUDENT IN AN ORGANIZED HEALTH CARE EDUCATION/TRAINING PROGRAM

## 2023-11-13 PROCEDURE — 86762 RUBELLA ANTIBODY: CPT

## 2023-11-13 PROCEDURE — 99213 OFFICE O/P EST LOW 20 MIN: CPT | Performed by: STUDENT IN AN ORGANIZED HEALTH CARE EDUCATION/TRAINING PROGRAM

## 2023-11-13 PROCEDURE — 85027 COMPLETE CBC AUTOMATED: CPT

## 2023-11-13 ASSESSMENT — PAIN SCALES - GENERAL: PAINLEVEL: NO PAIN (0)

## 2023-11-13 NOTE — PROGRESS NOTES
[facility]    CC: Initiation of prenatal care    Subjective:  Kaelyn Reed is feeling okay.  Nausea has improved.  Not taking any medications for nausea and not yet taking prenatal vitamin as she has not yet been approved for insurance.  Denies vaginal bleeding.  No prior significant medical history.    Pregnancy notable for:  - History of  delivery in , around 30 weeks in [facility] with  demise   - New immigrant     Objective:  BP 90/59   Pulse 76   Wt 51.3 kg (113 lb)   General: Alert, oriented no apparent distress  Abdomen: Nontender, fundus palpated just above pubic symphysis  : Normal external genitalia.  Normal vagina with white-tinged discharge.  Normal-appearing closed cervix.  Pap test collected.    See OB flowsheet    Assessment/plan:   23 year old  at 12w5d by 10w2d US presenting for initiation of prenatal care.     Prenatal care:  -OB labs ordered.  Pap test collected  -No indication for use of aspirin for preeclampsia prevention  -Genetics: Desires screening, consult placed  -Ultrasound: Ordered for MFM consult placed  -Immunizations: Declines influenza vaccine    History of  delivery in , around 30 weeks in [facility] with  demise   -From available history gathered, suspect  labor with inability to resuscitate  infant  -MFM referral placed to discuss possible cervical length screening    New immigrant  -Care coordination referral previously placed, verified phone number for patient  -Mental health referral previously placed    Follow-up in clinic in 4 weeks    Sabrina Fernandes MD

## 2023-11-14 DIAGNOSIS — O09.892 HISTORY OF PRETERM DELIVERY, CURRENTLY PREGNANT IN SECOND TRIMESTER: Primary | ICD-10-CM

## 2023-11-14 LAB
BACTERIA UR CULT: ABNORMAL
BACTERIA UR CULT: ABNORMAL
C TRACH DNA SPEC QL NAA+PROBE: NEGATIVE
N GONORRHOEA DNA SPEC QL NAA+PROBE: NEGATIVE
RUBV IGG SERPL QL IA: 1.26 INDEX
RUBV IGG SERPL QL IA: POSITIVE

## 2023-11-15 LAB
BKR LAB AP GYN ADEQUACY: NORMAL
BKR LAB AP GYN INTERPRETATION: NORMAL
BKR LAB AP HPV REFLEX: NO
BKR LAB AP PREVIOUS ABNORMAL: NORMAL
PATH REPORT.COMMENTS IMP SPEC: NORMAL
PATH REPORT.COMMENTS IMP SPEC: NORMAL
PATH REPORT.RELEVANT HX SPEC: NORMAL

## 2023-11-20 ENCOUNTER — PRE VISIT (OUTPATIENT)
Dept: MATERNAL FETAL MEDICINE | Facility: CLINIC | Age: 23
End: 2023-11-20

## 2023-11-28 ENCOUNTER — APPOINTMENT (OUTPATIENT)
Dept: INTERPRETER SERVICES | Facility: CLINIC | Age: 23
End: 2023-11-28

## 2023-12-11 ENCOUNTER — APPOINTMENT (OUTPATIENT)
Dept: INTERPRETER SERVICES | Facility: CLINIC | Age: 23
End: 2023-12-11

## 2023-12-18 DIAGNOSIS — O09.892 HISTORY OF PRETERM DELIVERY, CURRENTLY PREGNANT IN SECOND TRIMESTER: Primary | ICD-10-CM

## 2024-01-05 ENCOUNTER — APPOINTMENT (OUTPATIENT)
Dept: INTERPRETER SERVICES | Facility: CLINIC | Age: 24
End: 2024-01-05
Payer: MEDICAID

## 2024-02-19 ENCOUNTER — OFFICE VISIT (OUTPATIENT)
Dept: MATERNAL FETAL MEDICINE | Facility: CLINIC | Age: 24
End: 2024-02-19
Attending: OBSTETRICS & GYNECOLOGY
Payer: COMMERCIAL

## 2024-02-19 ENCOUNTER — HOSPITAL ENCOUNTER (OUTPATIENT)
Dept: ULTRASOUND IMAGING | Facility: CLINIC | Age: 24
Discharge: HOME | End: 2024-02-19
Attending: OBSTETRICS & GYNECOLOGY
Payer: COMMERCIAL

## 2024-02-19 ENCOUNTER — LAB (OUTPATIENT)
Dept: LAB | Facility: CLINIC | Age: 24
End: 2024-02-19
Attending: OBSTETRICS & GYNECOLOGY
Payer: COMMERCIAL

## 2024-02-19 DIAGNOSIS — O09.892 HISTORY OF PRETERM DELIVERY, CURRENTLY PREGNANT IN SECOND TRIMESTER: ICD-10-CM

## 2024-02-19 DIAGNOSIS — Z31.5 ENCOUNTER FOR PROCREATIVE GENETIC COUNSELING AND TESTING: ICD-10-CM

## 2024-02-19 DIAGNOSIS — Z36.9 UNSPECIFIED ANTENATAL SCREENING: ICD-10-CM

## 2024-02-19 DIAGNOSIS — Z36.89 ENCOUNTER FOR ULTRASOUND TO ASSESS FETAL GROWTH: Primary | ICD-10-CM

## 2024-02-19 DIAGNOSIS — Z31.5 ENCOUNTER FOR PROCREATIVE GENETIC COUNSELING AND TESTING: Primary | ICD-10-CM

## 2024-02-19 PROCEDURE — 36415 COLL VENOUS BLD VENIPUNCTURE: CPT

## 2024-02-19 PROCEDURE — 76811 OB US DETAILED SNGL FETUS: CPT | Mod: 26 | Performed by: OBSTETRICS & GYNECOLOGY

## 2024-02-19 PROCEDURE — 96040 HC GENETIC COUNSELING, EACH 30 MINUTES: CPT | Performed by: GENETIC COUNSELOR, MS

## 2024-02-19 PROCEDURE — 76811 OB US DETAILED SNGL FETUS: CPT

## 2024-02-19 NOTE — PROGRESS NOTES
[facility]  Genetic Counseling Consult    Patient:  Kaelyn Reed  Preferred Name: Kaelyn YOB: 2000   Date of Service:  24   MRN: [id number]    Kaelyn was seen at the [facility] for genetic consultation. The indication for genetic counseling is desire to discuss options for genetic screening and diagnostics. The patient was unaccompanied to this visit.     The session was conducted with a Lao ipad  ([employer]) due to the patient speaking limited English.      IMPRESSION/ PLAN   1. Kaelyn has not had genetic screening in this pregnancy but elected to have screening today.     2. During today's [facility] visit, Kaelyn had a blood draw for non-invasive prenatal testing (also called NIPT, NIPS, or cell-free DNA) through [employer] ([employer]). This NIPT screens for trisomy 21, 18, and 13 and the patient opted to screen for sex chromosome aneuploidies, including reported fetal sex. Results are expected in 7-10 days. The patient will be called with results and if they do not answer they requested a repeat call later to discuss results in a live conversation. Patient was informed that results, including fetal sex, will be available in [employer].    3. Kaelyn had a level II comprehensive anatomy ultrasound today. Please see the ultrasound report for further details.    4. Kaelyn had an [facility] physician consult with Dr. Benítez to discuss her history of  delivery and recommendations for management in this pregnancy.  Please see corresponding documentation for details.     PREGNANCY HISTORY   /Parity:       Kaelyn's pregnancy history is significant for:    delivery at ~7 months.  Unfortunately Kaelyn's daughter passed away after several days in the NICU due to complications of premature delivery.  This history was the focus of Kaelyn's consult with Dr. Benítez  "today, please see corresponding documentation for details and recommendations for management.     CURRENT PREGNANCY   Current Age: 23 year old   Age at Delivery: 23 year old  JAYLEN: 5/22/2024, by Ultrasound                                   Gestational Age: 26w5d  This pregnancy is a single gestation.   This pregnancy was conceived spontaneously.    MEDICAL HISTORY   Kaelyn s reported medical history is not expected to impact pregnancy management or risks to fetal development.       FAMILY HISTORY   A three-generation pedigree was obtained today and is scanned under the \"Media\" tab in Epic. The family history was reported by Kaelyn.    The following significant findings were reported today:   Kaelyn reports a nephew with a learning disability of unknown etiology.  Without knowing more about his diagnosis providing a precise risk estimate is difficult, but this history may represent an increased risk for other family members.     Otherwise, the reported family history is unremarkable for multiple miscarriages, stillbirths, birth defects, intellectual disabilities, known genetic conditions, and consanguinity.       RISK ASSESSMENT FOR INHERITED CONDITIONS AND CARRIER SCREENING OPTIONS   Expanded carrier screening is available to screen for autosomal recessive conditions and X-linked conditions in a large list of genes. Carrier screening does not test the pregnancy but gives a risk assessment for the pregnancy and future pregnancies to have the condition. Expanded carrier screening is designed to identify carrier status for conditions that are primarily childhood or adolescent onset. Expanded carrier screening does not evaluate for adult-onset conditions such as hereditary cancer syndromes, dementia/ Alzheimer's disease, or cardiovascular disease risk factors. Additionally, expanded carrier screening is not comprehensive for all known genetic diseases or inherited conditions. Carrier screening does not test for all genetic and " health conditions or risk factors.     Autosomal recessive conditions happen when a mutation has been inherited from the egg and sperm and include conditions like cystic fibrosis, thalassemia, hearing loss, spinal muscular atrophy, and more. We reviewed that when both biological parents carry a harmful genetic change in a gene associated with autosomal recessive inheritance, each of their pregnancies has a 1 in 4 (25%) chance to be affected by that condition. X-linked conditions happen when a mutation has been inherited from the egg and include conditions like fragile X syndrome.With x-linked conditions, the specific risk generally depends on the chromosomal sex of the fetus, with XY individuals (generally male) being most severely affected.      screening was not reviewed due to focus on other topics.  About [place] Screening    The patient has not had carrier screening previously.     Carrier screening was not discussed in detail today due to our focus on other topics. If the patient is interested in further discussing the option of carrier screening, MFM would be available to assist in coordination if desired.      RISK ASSESSMENT FOR CHROMOSOME CONDITIONS   We explained that the risk for fetal chromosome abnormalities increases with maternal age. We discussed specific features of common chromosome abnormalities, including trisomy 21 (Down syndrome), trisomy 13, trisomy 18, and sex chromosome trisomies.    At age 23 at midtrimester, the risk to have a baby with Down syndrome is 1 in 1114.  At age 23 at midtrimester, the risk to have a baby with any chromosome abnormality is 1 in 557.     Kaelyn has not had genetic screening in this pregnancy but elected to have screening today.      GENETIC TESTING OPTIONS   Genetic testing during a pregnancy includes screening and diagnostic procedures.      Screening tests are non-invasive which means no risk to the pregnancy and includes ultrasounds and blood work. The  benefits and limitations of screening were reviewed. Screening tests provide a risk assessment (chance) specific to the pregnancy for certain fetal chromosome abnormalities but cannot definitively diagnose or exclude a fetal chromosome abnormality. Follow-up genetic counseling and consideration of diagnostic testing is recommended with any abnormal screening result. Diagnostic testing during a pregnancy is more certain and can test for more conditions. However, the tests do have a risk of miscarriage that requires careful consideration. These tests can detect fetal chromosome abnormalities with greater than 99% certainty. Results can be compromised by maternal cell contamination or mosaicism and are limited by the resolution of current genetic testing technology.     There is no screening or diagnostic test that detects all forms of birth defects or intellectual disability.     We discussed the following screening options:     Non-invasive prenatal testing (NIPT)  Also called cell-free DNA screening because it detects chromosomes from the placenta in the pregnant person's blood  Can be done any time after 10 weeks gestation  Standard recommendation for NIPT screens for trisomy 21, trisomy 18, trisomy 13, with the option of adding sex chromosome aneuploidies, without or without predicted sex  Cannot screen for open neural tube defects, maternal serum AFP after 15 weeks is recommended  New NIPT options include screening for other trisomies, microdeletion syndromes, and in some cases fetal blood antigens. Guidelines do not recommend these conditions are included in standard screening. These options have limitations and should be discussed with a genetic counselor.     We discussed the following ultrasound options:    Comprehensive level II ultrasound (Fetal Anatomy Ultrasound)  Ultrasound done between 18-20 weeks gestation  Screens for major birth defects and markers for aneuploidy (like trisomy 21 and trisomy  18)  Includes looking at the fetus/baby's growth, heart, organs (stomach, kidneys), placenta, and amniotic fluid    We discussed the following diagnostic options:   Amniocentesis  Invasive diagnostic procedure done after 15 weeks gestation  The procedure collects a small sample of amniotic fluid for the purpose of chromosomal testing and/or other genetic testing  Diagnostic result; more than 99% sensitivity for fetal chromosome abnormalities  Testing for AFP in the amniotic fluid can test for open neural tube defects    It was a pleasure to be involved with Kaelyn s care. Face-to-face time of the meeting was  35  minutes.    Evan Mora, GC, MS, [facility]  Board Certified and [place] Licensed Genetic Counselor  [facility]  Maternal Fetal Medicine  Office: [phone]  [facility]: [phone]   Fax: [phone]  [facility]

## 2024-02-19 NOTE — PROGRESS NOTES
Please refer to ultrasound report under 'Imaging' Studies of 'Chart Review' tabs.    Deniz Benítez M.D.

## 2024-02-26 ENCOUNTER — TELEPHONE (OUTPATIENT)
Dept: MATERNAL FETAL MEDICINE | Facility: CLINIC | Age: 24
End: 2024-02-26
Payer: MEDICAID

## 2024-02-26 LAB — SCANNED LAB RESULT: NORMAL

## 2024-02-26 NOTE — TELEPHONE ENCOUNTER
2/26/2024       Called Kaelyn to discuss NIPT results.  Results came back negative for chromosome abnormalities in chromosomes 21, 18, & 13, as well as the sex chromosomes.  These test results do not definitively rule out the possibility of one of these conditions, but they do greatly reduce the likelihood.  The test identified sex chromosomes consistent with male sex (XY) which is consistent with ultrasound.  Kaelyn had no questions at this time and was encouraged to reach out if she has any questions or concerns in the future.  This call was facilitated by a Trin[place] language line , [place] [id number].     Evan Mora MS, [facility]  Licensed Genetic Counselor  Phone: [phone]  Pager: [phone]

## 2024-03-22 ENCOUNTER — OFFICE VISIT (OUTPATIENT)
Dept: MATERNAL FETAL MEDICINE | Facility: CLINIC | Age: 24
End: 2024-03-22
Attending: OBSTETRICS & GYNECOLOGY
Payer: COMMERCIAL

## 2024-03-22 ENCOUNTER — LAB (OUTPATIENT)
Dept: LAB | Facility: CLINIC | Age: 24
End: 2024-03-22
Attending: OBSTETRICS & GYNECOLOGY
Payer: COMMERCIAL

## 2024-03-22 ENCOUNTER — HOSPITAL ENCOUNTER (OUTPATIENT)
Dept: ULTRASOUND IMAGING | Facility: CLINIC | Age: 24
Discharge: HOME | End: 2024-03-22
Attending: OBSTETRICS & GYNECOLOGY
Payer: COMMERCIAL

## 2024-03-22 ENCOUNTER — TELEPHONE (OUTPATIENT)
Dept: OBGYN | Facility: CLINIC | Age: 24
End: 2024-03-22

## 2024-03-22 ENCOUNTER — PRENATAL OFFICE VISIT (OUTPATIENT)
Dept: OBGYN | Facility: CLINIC | Age: 24
End: 2024-03-22
Attending: ADVANCED PRACTICE MIDWIFE
Payer: COMMERCIAL

## 2024-03-22 VITALS — WEIGHT: 119 LBS | SYSTOLIC BLOOD PRESSURE: 98 MMHG | HEART RATE: 75 BPM | DIASTOLIC BLOOD PRESSURE: 58 MMHG

## 2024-03-22 DIAGNOSIS — O09.899 HISTORY OF PRETERM DELIVERY, CURRENTLY PREGNANT: ICD-10-CM

## 2024-03-22 DIAGNOSIS — O26.10 LOW MATERNAL WEIGHT GAIN, UNSPECIFIED TRIMESTER: ICD-10-CM

## 2024-03-22 DIAGNOSIS — Z84.89: ICD-10-CM

## 2024-03-22 DIAGNOSIS — O28.3 ABNORMAL FETAL ULTRASOUND: ICD-10-CM

## 2024-03-22 DIAGNOSIS — Z34.93 THIRD TRIMESTER PREGNANCY: Primary | ICD-10-CM

## 2024-03-22 DIAGNOSIS — D50.8 IRON DEFICIENCY ANEMIA SECONDARY TO INADEQUATE DIETARY IRON INTAKE: ICD-10-CM

## 2024-03-22 DIAGNOSIS — Z36.89 ENCOUNTER FOR ULTRASOUND TO ASSESS FETAL GROWTH: ICD-10-CM

## 2024-03-22 DIAGNOSIS — Z13.31 POSITIVE SCREENING FOR DEPRESSION ON 9-ITEM PATIENT HEALTH QUESTIONNAIRE (PHQ-9): ICD-10-CM

## 2024-03-22 DIAGNOSIS — O09.90 HIGH-RISK PREGNANCY, UNSPECIFIED TRIMESTER: ICD-10-CM

## 2024-03-22 DIAGNOSIS — Z36.89 ENCOUNTER FOR ULTRASOUND TO ASSESS FETAL GROWTH: Primary | ICD-10-CM

## 2024-03-22 DIAGNOSIS — D50.8 OTHER IRON DEFICIENCY ANEMIA: ICD-10-CM

## 2024-03-22 DIAGNOSIS — Z59.87 INADEQUATE MATERIAL RESOURCES: ICD-10-CM

## 2024-03-22 DIAGNOSIS — Z34.93 THIRD TRIMESTER PREGNANCY: ICD-10-CM

## 2024-03-22 DIAGNOSIS — Z34.91 FIRST TRIMESTER PREGNANCY: ICD-10-CM

## 2024-03-22 DIAGNOSIS — D50.8 OTHER IRON DEFICIENCY ANEMIA: Primary | ICD-10-CM

## 2024-03-22 DIAGNOSIS — O35.HXX0 SHORT FEMUR OF FETUS ON PRENATAL ULTRASOUND: ICD-10-CM

## 2024-03-22 PROBLEM — O21.9 NAUSEA AND VOMITING IN PREGNANCY: Status: RESOLVED | Noted: 2023-11-08 | Resolved: 2024-03-22

## 2024-03-22 LAB
ERYTHROCYTE [DISTWIDTH] IN BLOOD BY AUTOMATED COUNT: 14.5 % (ref 10–15)
FERRITIN SERPL-MCNC: 7 NG/ML (ref 6–175)
GLUCOSE 1H P 50 G GLC PO SERPL-MCNC: 88 MG/DL (ref 70–129)
HCT VFR BLD AUTO: 29.7 % (ref 35–47)
HGB BLD-MCNC: 9.3 G/DL (ref 11.7–15.7)
IRON BINDING CAPACITY (ROCHE): 526 UG/DL (ref 240–430)
IRON SATN MFR SERPL: 4 % (ref 15–46)
IRON SERPL-MCNC: 19 UG/DL (ref 37–145)
MCH RBC QN AUTO: 23.9 PG (ref 26.5–33)
MCHC RBC AUTO-ENTMCNC: 31.3 G/DL (ref 31.5–36.5)
MCV RBC AUTO: 76 FL (ref 78–100)
PLATELET # BLD AUTO: 286 10E3/UL (ref 150–450)
RBC # BLD AUTO: 3.89 10E6/UL (ref 3.8–5.2)
T PALLIDUM AB SER QL: NONREACTIVE
VIT D+METAB SERPL-MCNC: 19 NG/ML (ref 20–50)
WBC # BLD AUTO: 7.4 10E3/UL (ref 4–11)

## 2024-03-22 PROCEDURE — 86780 TREPONEMA PALLIDUM: CPT

## 2024-03-22 PROCEDURE — 83550 IRON BINDING TEST: CPT

## 2024-03-22 PROCEDURE — 85014 HEMATOCRIT: CPT

## 2024-03-22 PROCEDURE — 36415 COLL VENOUS BLD VENIPUNCTURE: CPT

## 2024-03-22 PROCEDURE — 99207 PR PRENATAL VISIT: CPT | Performed by: ADVANCED PRACTICE MIDWIFE

## 2024-03-22 PROCEDURE — 76816 OB US FOLLOW-UP PER FETUS: CPT

## 2024-03-22 PROCEDURE — 82306 VITAMIN D 25 HYDROXY: CPT

## 2024-03-22 PROCEDURE — 82950 GLUCOSE TEST: CPT

## 2024-03-22 PROCEDURE — 76816 OB US FOLLOW-UP PER FETUS: CPT | Mod: 26 | Performed by: OBSTETRICS & GYNECOLOGY

## 2024-03-22 PROCEDURE — G0463 HOSPITAL OUTPT CLINIC VISIT: HCPCS | Performed by: ADVANCED PRACTICE MIDWIFE

## 2024-03-22 PROCEDURE — 82728 ASSAY OF FERRITIN: CPT

## 2024-03-22 PROCEDURE — 86787 VARICELLA-ZOSTER ANTIBODY: CPT

## 2024-03-22 PROCEDURE — 99213 OFFICE O/P EST LOW 20 MIN: CPT | Mod: 25 | Performed by: OBSTETRICS & GYNECOLOGY

## 2024-03-22 RX ORDER — MEPERIDINE HYDROCHLORIDE 25 MG/ML
25 INJECTION INTRAMUSCULAR; INTRAVENOUS; SUBCUTANEOUS EVERY 30 MIN PRN
Status: CANCELLED | OUTPATIENT
Start: 2024-03-25

## 2024-03-22 RX ORDER — MULTIVIT WITH MINERALS/LUTEIN
250 TABLET ORAL DAILY
Qty: 90 TABLET | Refills: 2 | Status: ON HOLD | OUTPATIENT
Start: 2024-03-22 | End: 2024-05-13

## 2024-03-22 RX ORDER — LANOLIN ALCOHOL/MO/W.PET/CERES
1000 CREAM (GRAM) TOPICAL DAILY
Qty: 90 TABLET | Refills: 2 | Status: ON HOLD | OUTPATIENT
Start: 2024-03-22 | End: 2024-05-13

## 2024-03-22 RX ORDER — CHOLECALCIFEROL (VITAMIN D3) 50 MCG
1 TABLET ORAL DAILY
Qty: 90 TABLET | Refills: 3 | Status: SHIPPED | OUTPATIENT
Start: 2024-03-22

## 2024-03-22 RX ORDER — METHYLPREDNISOLONE SODIUM SUCCINATE 125 MG/2ML
125 INJECTION, POWDER, LYOPHILIZED, FOR SOLUTION INTRAMUSCULAR; INTRAVENOUS
Status: CANCELLED
Start: 2024-03-25

## 2024-03-22 RX ORDER — PRENATAL VIT/IRON FUM/FOLIC AC 27MG-0.8MG
1 TABLET ORAL DAILY
Qty: 90 TABLET | Refills: 3 | Status: SHIPPED | OUTPATIENT
Start: 2024-03-22 | End: 2024-05-03

## 2024-03-22 RX ORDER — FERROUS SULFATE 325(65) MG
325 TABLET ORAL
Qty: 90 TABLET | Refills: 2 | Status: ON HOLD | OUTPATIENT
Start: 2024-03-22 | End: 2024-05-13

## 2024-03-22 RX ORDER — DIPHENHYDRAMINE HYDROCHLORIDE 50 MG/ML
50 INJECTION INTRAMUSCULAR; INTRAVENOUS
Status: CANCELLED
Start: 2024-03-25

## 2024-03-22 RX ORDER — ALBUTEROL SULFATE 0.83 MG/ML
2.5 SOLUTION RESPIRATORY (INHALATION)
Status: CANCELLED | OUTPATIENT
Start: 2024-03-25

## 2024-03-22 RX ORDER — HEPARIN SODIUM (PORCINE) LOCK FLUSH IV SOLN 100 UNIT/ML 100 UNIT/ML
5 SOLUTION INTRAVENOUS
Status: CANCELLED | OUTPATIENT
Start: 2024-03-25

## 2024-03-22 RX ORDER — HEPARIN SODIUM,PORCINE 10 UNIT/ML
5-20 VIAL (ML) INTRAVENOUS DAILY PRN
Status: CANCELLED | OUTPATIENT
Start: 2024-03-25

## 2024-03-22 RX ORDER — ALBUTEROL SULFATE 90 UG/1
1-2 AEROSOL, METERED RESPIRATORY (INHALATION)
Status: CANCELLED
Start: 2024-03-25

## 2024-03-22 RX ORDER — EPINEPHRINE 1 MG/ML
0.3 INJECTION, SOLUTION, CONCENTRATE INTRAVENOUS EVERY 5 MIN PRN
Status: CANCELLED | OUTPATIENT
Start: 2024-03-25

## 2024-03-22 SDOH — ECONOMIC STABILITY - INCOME SECURITY: MATERIAL HARDSHIP DUE TO LIMITED FINANCIAL RESOURCES, NOT ELSEWHERE CLASSIFIED: Z59.87

## 2024-03-22 NOTE — PATIENT INSTRUCTIONS
Thank you for trusting us with your care!     If you need to contact us for questions about:  Symptoms, Scheduling & Medical Questions; Non-urgent (2-3 day response) Dyana message, Urgent (needing response today) [phone] (if after 3:30pm next day response)   Prescriptions: Please call your Pharmacy   Billing: Hilda [phone] or [facility]:[phone]

## 2024-03-22 NOTE — NURSING NOTE
Patient reports positiie fetal movement, no pain, no contractions, leaking of fluid, or bleeding.  Patient denies headache, visual changes, nausea/vomiting, epigastric pain related to preeclampsia.  Education provided to patient on today's ultrasound. Patient states she has not been seen since November due to lack of insurance. She states she has insurance at this time, called [facility] and they are able to see her at 11:40 today for return prenatal visit.   SBAR given to DAIJA ARTEAGA, see their note in Epic.

## 2024-03-22 NOTE — PROGRESS NOTES
Please see the imaging tab for details of the ultrasound performed today.    Lashaun Morse MD  Specialist in Maternal-Fetal Medicine

## 2024-03-22 NOTE — ASSESSMENT & PLAN NOTE
[facility] History of infant demise in NICU.

## 2024-03-22 NOTE — TELEPHONE ENCOUNTER
LVM with  to call back and schedule the rest of her OB visits (at 33 weeks and then weekly 35-40 weeks).

## 2024-03-22 NOTE — PROGRESS NOTES
23 year old  at 31w2d presentst for a routine prenatal appointment.    Denies vaginal bleeding or leakage of fluid.  Denies contractions. Positive fetal movement.       No HA, visual changes, RUQ or epigastric pain.   Patient concerns: Has not been seen as she did not have insurance.  Wants to have a vaginal birth.   Feeling well overall.    Objective:  Vitals:    24 1111   BP: 98/58   Pulse: 75   Weight: 54 kg (119 lb)     See OB flowsheet      Education completed today includes breast feeding, [facility] hand out, contraception, counting movements, signs of pre-term labor, when to present to birthplace, post partum depression, and GBS.  Birth preferences reviewed: Medicated  Labor support:  down-,brothers and sisters   [place] Feeding plans : Unsure   Contraception planned:  depoprovera  The following labs were ordered today:   GCT, CBC w platelets, Vitamin d, Anti-treponema   Water birth consent form was not given.  Blood type: O POS    Antibody Screen   Date Value Ref Range Status   2023 Negative Negative Final   Rhogam  was not given.  TDAP was not given.  A/P:  Encounter Diagnoses   Name Primary?     Third trimester pregnancy Yes     First trimester pregnancy      High-risk pregnancy, unspecified trimester      Other iron deficiency anemia      Abnormal fetal ultrasound      Family history of unexplained  death      History of  delivery, currently pregnant      Positive screening for depression on 9-item Patient Health Questionnaire (PHQ-9)      Inadequate material resources      Iron deficiency anemia secondary to inadequate dietary iron intake      Orders Placed This Encounter   Procedures     Glucose tolerance gest screen 1 hour     Treponema Abs w Reflex to RPR and Titer     CBC with platelets     25- OH-Vitamin D     Ferritin     Iron and Iron Binding Capacity     Varicella Zoster Virus Antibody IgG     Chlamydia trachomatis PCR     Neisseria  gonorrhoeae PCR     Orders Placed This Encounter   Medications     Prenatal Vit-Fe Fumarate-FA (PRENATAL MULTIVITAMIN W/IRON) 27-0.8 MG tablet     Sig: Take 1 tablet by mouth daily     Dispense:  90 tablet     Refill:  3     vitamin D3 (CHOLECALCIFEROL) 50 mcg (2000 units) tablet     Sig: Take 1 tablet (50 mcg) by mouth daily Take one tablet daily.     Dispense:  90 tablet     Refill:  3     Plan:  -Reviewed EOB labs, orders placed, and EOB folder.  -Patient declined TDAP  -Would like a vaginal birth with the midwives  - will call to help schedule future visits.  -To begin prenatal vitamin and vitamin D.  -SW, mental health referral placed. Rns notified to call and help set up.   -Will need iron infusions set up.    If any questions or concerns pt to call clinic or RTC. Patient agreeable to this plan.    I, Mylene SANCHEZ , am serving as a scribe; to document services personally performed by  Yoselyn Washington CNM based on data collection and the provider's statements to me.   I agree with the PFSH and ROS as completed by the student, except for changes made by me. The remainder of the encounter scribed by the student. The scribed note accurately reflects my personal services and decisions made by me.  Yoselyn Washington DNP, CNM, FRANKI SANCHEZ  I agree with the PFSH and ROS as completed by the student, except for changes made by me. The remainder of the encounter scribed by the student. The scribed note accurately reflects my personal services and decisions made by me.  Yoselyn Washington DNP, CNM, FRANKI

## 2024-03-25 ENCOUNTER — TELEPHONE (OUTPATIENT)
Dept: OBGYN | Facility: CLINIC | Age: 24
End: 2024-03-25
Payer: MEDICAID

## 2024-03-25 ENCOUNTER — APPOINTMENT (OUTPATIENT)
Dept: INTERPRETER SERVICES | Facility: CLINIC | Age: 24
End: 2024-03-25
Payer: MEDICAID

## 2024-03-25 DIAGNOSIS — Z34.93 PRENATAL CARE IN THIRD TRIMESTER: ICD-10-CM

## 2024-03-25 DIAGNOSIS — Z75.4 INADEQUATE COMMUNITY RESOURCES: Primary | ICD-10-CM

## 2024-03-25 PROBLEM — Z28.39 MATERNAL VARICELLA, NON-IMMUNE: Status: ACTIVE | Noted: 2024-03-25

## 2024-03-25 PROBLEM — Z28.39 MATERNAL VARICELLA, NON-IMMUNE: Status: ACTIVE | Noted: 2024-03-22

## 2024-03-25 PROBLEM — O09.899 MATERNAL VARICELLA, NON-IMMUNE: Status: ACTIVE | Noted: 2024-03-25

## 2024-03-25 LAB
VZV IGG SER QL IA: 108.2 INDEX
VZV IGG SER QL IA: NORMAL

## 2024-03-25 NOTE — TELEPHONE ENCOUNTER
Called and spoke with the patient with the help of a  to go over her lab results with her and instructions.     I went over that her iron levels are very low and it is recommended that she start IV iron infusions, take iron supplements, Vitamin C, Vitamin B12, and also Vitamind D(is low as well.     The  went over this with the patient and the patient stated she only picked up 2 prescriptions. I did go over that there should have been a total of 5. I did have the  let her know to  the remaining prescriptions tomorrow and I will call to make sure that they are ready for her.     I also gave the phone number to the infusion center [phone] for the patient to call if she does not hear from them tomorrow.     The  did let me know that the patient has a hard time even understanding in Pashto.     All questions were answered with the help of the .

## 2024-03-26 ENCOUNTER — DOCUMENTATION ONLY (OUTPATIENT)
Dept: CARE COORDINATION | Facility: CLINIC | Age: 24
End: 2024-03-26
Payer: MEDICAID

## 2024-03-26 ENCOUNTER — APPOINTMENT (OUTPATIENT)
Dept: INTERPRETER SERVICES | Facility: CLINIC | Age: 24
End: 2024-03-26
Payer: MEDICAID

## 2024-03-26 PROBLEM — O26.10 LOW MATERNAL WEIGHT GAIN, UNSPECIFIED TRIMESTER: Status: ACTIVE | Noted: 2024-03-26

## 2024-03-26 NOTE — PROGRESS NOTES
Social Work Consult    Reason for Consult: SW received referral for patient for resources related to her pregnancy.     Data: MILDRED spoke with Kaelyn on the phone this morning with the support from a . Kaelyn reports that she is currently 31 weeks pregnant. She is a [place]. Kaelyn has [facility] insurance.     Assessment: Kaelyn reported that she does not have any baby supplies yet for her baby. Discussed [employer], which is a program that works with Kaelyn's insurance and can provide her with a car seat. Kaelyn was interested in a referral to this program. SW placed referral.     Also discussed WIC and informed Kaelyn about this supplemental nutrition program, which she would automatically qualify for due to her current medical assistance insurance. Kaelyn was also interested in a referral to this program. MILDRED placed referral.     Finally, MILDRED discussed the Family Home Visiting Program through [facility]. This program can support her during and after her pregnancy and birth of her baby with this such as: adjusting to new baby, providing postpartum care, assisting with resources, supporting bonding, supporting development, and providing a safe sleep space (pack 'n play) for her baby. Kaelyn was also interested in this referral. MILDRED placed referral.     Kaelyn had additional questions about where she might purchase discounted clothing and diapers for her baby. MILDRED inquired if Kaelyn has an email address that MILDRED could send some additional resources for those things to. Kaelyn does not have an email address. MILDRED let Kaelyn know that when she comes to the hospital to have her baby, SW has a limited amount of clothing and diapers that can be provided to her. MILDRED will also provide her with a list of other places to purchase discounted items at that time. Kaelyn expressed understanding.     Plan: Kaelyn did not have any additional questions or needs for SW today. MILDRED provided Kaelyn with phone  "number, and can remain available for any questions or needs that may arise throughout her pregnancy.     Mary Burt, BRIAN, [facility]  Maternal and Child Health   M-F 08:00-16:30 on [employer]   Office Phone: [phone]  [email or web address]    After hours social work can be reached via [employer] @ \"Peds SW After Hours On Call 1620 to 08\"  Weekend on-site social work can be reached via [employer] @ \"Peds SW Weekend Onsite 08 to 1630\"    "

## 2024-04-02 ENCOUNTER — TELEPHONE (OUTPATIENT)
Dept: OBGYN | Facility: CLINIC | Age: 24
End: 2024-04-02
Payer: MEDICAID

## 2024-04-02 NOTE — TELEPHONE ENCOUNTER
Pt with moderate anemia in pregnancy, Hgb 9.3 on last check.  Would recommend IV iron infusion without delay d/t current gestation and anticipated blood loss related to childbirth.     FRANKI HernandezM

## 2024-04-03 ENCOUNTER — TELEPHONE (OUTPATIENT)
Dept: OBGYN | Facility: CLINIC | Age: 24
End: 2024-04-03
Payer: MEDICAID

## 2024-04-03 NOTE — TELEPHONE ENCOUNTER
----- Message from Sana Greene sent at 4/3/2024 12:06 PM CDT -----  Regarding: RE: Iron Infusion - No active insurance  Hello,     The patient insurance coverage is inactive. Per our Financial Counseling dept the patient currently does not qualify for MA, in order to receive the Iron infusion on 04.10.24 and 04.12.24 the patient will need to pay up front $793.36 for each visit. I have reached out to the patient to let her know of this information. The patient stated that she will wait until her MA is active again (patient was not given a time when her insurance will be active again), and also advised the patient that we will notify the care team of this information as well, most likely the provider or nurse will reach out to the patient.     Let us know if you have any questions. Thanks!     Sana   ----- Message -----  From: Charlette Gomez RN  Sent: 4/2/2024   3:49 PM CDT  To: Sana Greene; [facility]  Subject: RE: Iron Infusion - No active insurance          Alfred Hood! Note is in chart for the financial waiver. Please contact pt.   ----- Message -----  From: Jaylen García APRN CNM  Sent: 4/2/2024   2:14 PM CDT  To: [facility]  Subject: RE: Iron Infusion - No active insurance          I wrote an encounter.  Let me know if there's anything I should change or if it's sufficient.    - AK     ----- Message -----  From: Charlette Gomez RN  Sent: 4/2/2024  12:01 PM CDT  To: #  Subject: RE: Iron Infusion - No active insurance          For the financial waiver, we need a telephone encounter in her chart documenting the clinical urgency of pt's anemia (hgb 9.3). Could one of you write up that telephone encounter? I'm unsure if the financial team will accept it if it is from RNs instead of advanced practice providers.    ----- Message -----  From: Lyndsey Aguirre APRN CNM  Sent: 4/1/2024   1:31 PM CDT  To: Lydia [facility]  Subject: FW: Iron Infusion - No active insurance           Can one of you reach out to see if a financial waiver is possible?  If not, please call the patient and let her know that she should start oral iron if she hasn't started it yet.  Please instruct the patient on the following supplements for anemia:  -Ferrous Sulfate 325mg once daily  -Vitamin C 250mg once daily  -Vitamin B12 1000mcg once daily    FRANKI Arauz CNM    ----- Message -----  From: Makenzie Arndt RN  Sent: 4/1/2024  10:18 AM CDT  To: #  Subject: FW: Iron Infusion - No active insurance            ----- Message -----  From: Sana Greene  Sent: 4/1/2024   8:48 AM CDT  To: Makenzie Arndt RN; FRANKI Fan CNM; #  Subject: Iron Infusion - No active insurance              Hello,     The patient has a Venofer Infusion on 04.03.24. As of today we checked the insurance [employer] and [employer], both are showing inactive.     Our option are as follow -     1)      Postponement - Until we have active insurance     If postponement is selected, a member of the care team (Provider/RNCC) must contact patient to re-schedule/answer clinical questions.  If the financial waiver selected, an Infusion  will contact the patient with the cost of the infusion once the telephone encounter documenting clinically urgency is placed on chart. Please reply all to the in basket when this is completed so the patient can be notified.     Please let us know if you have any questions.    Thanks!     Sana

## 2024-04-03 NOTE — TELEPHONE ENCOUNTER
Called Kaelyn to see if she has started her oral iron supplements.  She has been unable to  all her medications and was only able to  her vitamin B 12 and the prenatal vitamin.     Sent staff message to the  that Kaelyn is working with, Yoselyn Burt, to see if she has any resources to help with obtaining these crucial supplements.

## 2024-04-08 ENCOUNTER — TELEPHONE (OUTPATIENT)
Dept: OBGYN | Facility: CLINIC | Age: 24
End: 2024-04-08
Payer: MEDICAID

## 2024-04-12 ENCOUNTER — INFUSION THERAPY VISIT (OUTPATIENT)
Dept: INFUSION THERAPY | Facility: CLINIC | Age: 24
End: 2024-04-12
Attending: MIDWIFE
Payer: MEDICAID

## 2024-04-12 VITALS
SYSTOLIC BLOOD PRESSURE: 97 MMHG | OXYGEN SATURATION: 99 % | TEMPERATURE: 97.9 F | DIASTOLIC BLOOD PRESSURE: 57 MMHG | HEART RATE: 78 BPM

## 2024-04-12 DIAGNOSIS — D50.8 IRON DEFICIENCY ANEMIA SECONDARY TO INADEQUATE DIETARY IRON INTAKE: ICD-10-CM

## 2024-04-12 DIAGNOSIS — O09.90 HIGH-RISK PREGNANCY, UNSPECIFIED TRIMESTER: Primary | ICD-10-CM

## 2024-04-12 PROCEDURE — 250N000011 HC RX IP 250 OP 636: Mod: JZ | Performed by: MIDWIFE

## 2024-04-12 PROCEDURE — 96366 THER/PROPH/DIAG IV INF ADDON: CPT

## 2024-04-12 PROCEDURE — 96365 THER/PROPH/DIAG IV INF INIT: CPT

## 2024-04-12 RX ORDER — ALBUTEROL SULFATE 0.83 MG/ML
2.5 SOLUTION RESPIRATORY (INHALATION)
Status: CANCELLED | OUTPATIENT
Start: 2024-04-14

## 2024-04-12 RX ORDER — METHYLPREDNISOLONE SODIUM SUCCINATE 125 MG/2ML
125 INJECTION, POWDER, LYOPHILIZED, FOR SOLUTION INTRAMUSCULAR; INTRAVENOUS
Status: CANCELLED
Start: 2024-04-14

## 2024-04-12 RX ORDER — ALBUTEROL SULFATE 90 UG/1
1-2 AEROSOL, METERED RESPIRATORY (INHALATION)
Status: CANCELLED
Start: 2024-04-14

## 2024-04-12 RX ORDER — MEPERIDINE HYDROCHLORIDE 25 MG/ML
25 INJECTION INTRAMUSCULAR; INTRAVENOUS; SUBCUTANEOUS EVERY 30 MIN PRN
Status: CANCELLED | OUTPATIENT
Start: 2024-04-14

## 2024-04-12 RX ORDER — HEPARIN SODIUM (PORCINE) LOCK FLUSH IV SOLN 100 UNIT/ML 100 UNIT/ML
5 SOLUTION INTRAVENOUS
Status: CANCELLED | OUTPATIENT
Start: 2024-04-14

## 2024-04-12 RX ORDER — DIPHENHYDRAMINE HYDROCHLORIDE 50 MG/ML
50 INJECTION INTRAMUSCULAR; INTRAVENOUS
Status: CANCELLED
Start: 2024-04-14

## 2024-04-12 RX ORDER — HEPARIN SODIUM,PORCINE 10 UNIT/ML
5-20 VIAL (ML) INTRAVENOUS DAILY PRN
Status: CANCELLED | OUTPATIENT
Start: 2024-04-14

## 2024-04-12 RX ORDER — EPINEPHRINE 1 MG/ML
0.3 INJECTION, SOLUTION, CONCENTRATE INTRAVENOUS EVERY 5 MIN PRN
Status: CANCELLED | OUTPATIENT
Start: 2024-04-14

## 2024-04-12 RX ADMIN — IRON SUCROSE 300 MG: 20 INJECTION, SOLUTION INTRAVENOUS at 13:40

## 2024-04-12 NOTE — PROGRESS NOTES
Infusion Nursing Note:  Kaelyn Wagner Brianna presents today for venofer.    Patient seen by provider today: No   present during visit today: Yes, Language: Belarusian.     Note: Pt here for venofer 1/3.  contacted via ipad.       Intravenous Access:  Peripheral IV placed.    Treatment Conditions:  Not Applicable.      Post Infusion Assessment:  Patient tolerated infusion without incident.  Site patent and intact, free from redness, edema or discomfort.  No evidence of extravasations.  Access discontinued per protocol.       Discharge Plan:   Discharge instructions reviewed with: Patient.  Patient and/or family verbalized understanding of discharge instructions and all questions answered.  Patient discharged in stable condition accompanied by: self.  Departure Mode: Ambulatory.      Nahomi Sierra RN

## 2024-04-19 ENCOUNTER — OFFICE VISIT (OUTPATIENT)
Dept: MATERNAL FETAL MEDICINE | Facility: CLINIC | Age: 24
End: 2024-04-19
Attending: OBSTETRICS & GYNECOLOGY
Payer: MEDICAID

## 2024-04-19 ENCOUNTER — HOSPITAL ENCOUNTER (OUTPATIENT)
Dept: ULTRASOUND IMAGING | Facility: CLINIC | Age: 24
Discharge: HOME | End: 2024-04-19
Attending: OBSTETRICS & GYNECOLOGY
Payer: MEDICAID

## 2024-04-19 VITALS — SYSTOLIC BLOOD PRESSURE: 105 MMHG | DIASTOLIC BLOOD PRESSURE: 62 MMHG

## 2024-04-19 DIAGNOSIS — Z36.89 ENCOUNTER FOR ULTRASOUND TO ASSESS FETAL GROWTH: ICD-10-CM

## 2024-04-19 DIAGNOSIS — O36.5990 FETAL GROWTH RESTRICTION ANTEPARTUM: Primary | ICD-10-CM

## 2024-04-19 PROCEDURE — 76820 UMBILICAL ARTERY ECHO: CPT | Mod: 26 | Performed by: OBSTETRICS & GYNECOLOGY

## 2024-04-19 PROCEDURE — 76816 OB US FOLLOW-UP PER FETUS: CPT | Mod: 26 | Performed by: OBSTETRICS & GYNECOLOGY

## 2024-04-19 PROCEDURE — 59025 FETAL NON-STRESS TEST: CPT | Mod: 26 | Performed by: OBSTETRICS & GYNECOLOGY

## 2024-04-19 PROCEDURE — 59025 FETAL NON-STRESS TEST: CPT

## 2024-04-19 PROCEDURE — 76816 OB US FOLLOW-UP PER FETUS: CPT

## 2024-04-19 PROCEDURE — 99213 OFFICE O/P EST LOW 20 MIN: CPT | Mod: 25 | Performed by: OBSTETRICS & GYNECOLOGY

## 2024-04-19 NOTE — PROGRESS NOTES
"Please see \"Imaging\" tab under \"Chart Review\" for details of today's visit.    Cielo Conner    "

## 2024-04-19 NOTE — NURSING NOTE
Ipad  used for MFM appt.  Pt reports positive fetal movement, denies contractions, bleeding, loss of fluid, headache, swelling, and visual changes.  Gap in regular prenatal care, spoke with [facility] RN who arranged for appt next Friday prior to her iron infusion.  Information regarding appt given to pt, encouraged to attend.  SBAR given to MD.

## 2024-04-25 ENCOUNTER — OFFICE VISIT (OUTPATIENT)
Dept: MATERNAL FETAL MEDICINE | Facility: CLINIC | Age: 24
End: 2024-04-25
Attending: OBSTETRICS & GYNECOLOGY
Payer: MEDICAID

## 2024-04-25 ENCOUNTER — HOSPITAL ENCOUNTER (OUTPATIENT)
Dept: ULTRASOUND IMAGING | Facility: CLINIC | Age: 24
Discharge: HOME | End: 2024-04-25
Attending: OBSTETRICS & GYNECOLOGY
Payer: MEDICAID

## 2024-04-25 VITALS — SYSTOLIC BLOOD PRESSURE: 101 MMHG | DIASTOLIC BLOOD PRESSURE: 64 MMHG

## 2024-04-25 DIAGNOSIS — O36.5990 FETAL GROWTH RESTRICTION ANTEPARTUM: ICD-10-CM

## 2024-04-25 PROCEDURE — 59025 FETAL NON-STRESS TEST: CPT

## 2024-04-25 PROCEDURE — 59025 FETAL NON-STRESS TEST: CPT | Mod: 26 | Performed by: OBSTETRICS & GYNECOLOGY

## 2024-04-25 PROCEDURE — 76820 UMBILICAL ARTERY ECHO: CPT

## 2024-04-25 PROCEDURE — 76815 OB US LIMITED FETUS(S): CPT | Mod: 26 | Performed by: OBSTETRICS & GYNECOLOGY

## 2024-04-25 PROCEDURE — 76820 UMBILICAL ARTERY ECHO: CPT | Mod: 26 | Performed by: OBSTETRICS & GYNECOLOGY

## 2024-04-25 NOTE — NURSING NOTE
NST Performed due to FGR.   reviewed efm tracing. See NST/BPP Doc Flowsheet tab.    Ipad  used for MFM appt.  Pt reports positive fetal movement, denies ctx, bleeding, loss of fluid and other concerns.  SBAR given to MD.

## 2024-04-26 ENCOUNTER — PRENATAL OFFICE VISIT (OUTPATIENT)
Dept: OBGYN | Facility: CLINIC | Age: 24
End: 2024-04-26
Attending: ADVANCED PRACTICE MIDWIFE
Payer: MEDICAID

## 2024-04-26 ENCOUNTER — INFUSION THERAPY VISIT (OUTPATIENT)
Dept: INFUSION THERAPY | Facility: CLINIC | Age: 24
End: 2024-04-26
Attending: MIDWIFE
Payer: MEDICAID

## 2024-04-26 VITALS
HEART RATE: 79 BPM | DIASTOLIC BLOOD PRESSURE: 54 MMHG | RESPIRATION RATE: 16 BRPM | SYSTOLIC BLOOD PRESSURE: 92 MMHG | OXYGEN SATURATION: 98 % | TEMPERATURE: 98 F

## 2024-04-26 VITALS
HEIGHT: 58 IN | BODY MASS INDEX: 25.63 KG/M2 | WEIGHT: 122.1 LBS | SYSTOLIC BLOOD PRESSURE: 99 MMHG | HEART RATE: 76 BPM | DIASTOLIC BLOOD PRESSURE: 62 MMHG

## 2024-04-26 DIAGNOSIS — D50.8 IRON DEFICIENCY ANEMIA SECONDARY TO INADEQUATE DIETARY IRON INTAKE: ICD-10-CM

## 2024-04-26 DIAGNOSIS — O36.5990 FETAL GROWTH RESTRICTION ANTEPARTUM: ICD-10-CM

## 2024-04-26 DIAGNOSIS — O09.90 HIGH-RISK PREGNANCY, UNSPECIFIED TRIMESTER: Primary | ICD-10-CM

## 2024-04-26 DIAGNOSIS — O09.93 HRP (HIGH RISK PREGNANCY), THIRD TRIMESTER: Primary | ICD-10-CM

## 2024-04-26 PROCEDURE — 87591 N.GONORRHOEAE DNA AMP PROB: CPT | Performed by: ADVANCED PRACTICE MIDWIFE

## 2024-04-26 PROCEDURE — 96365 THER/PROPH/DIAG IV INF INIT: CPT

## 2024-04-26 PROCEDURE — 87491 CHLMYD TRACH DNA AMP PROBE: CPT | Performed by: ADVANCED PRACTICE MIDWIFE

## 2024-04-26 PROCEDURE — 87653 STREP B DNA AMP PROBE: CPT | Performed by: ADVANCED PRACTICE MIDWIFE

## 2024-04-26 PROCEDURE — G0463 HOSPITAL OUTPT CLINIC VISIT: HCPCS | Mod: 25 | Performed by: ADVANCED PRACTICE MIDWIFE

## 2024-04-26 PROCEDURE — 250N000011 HC RX IP 250 OP 636: Performed by: MIDWIFE

## 2024-04-26 PROCEDURE — 99207 PR PRENATAL VISIT: CPT | Performed by: ADVANCED PRACTICE MIDWIFE

## 2024-04-26 PROCEDURE — G0463 HOSPITAL OUTPT CLINIC VISIT: HCPCS | Performed by: ADVANCED PRACTICE MIDWIFE

## 2024-04-26 PROCEDURE — 258N000003 HC RX IP 258 OP 636: Performed by: MIDWIFE

## 2024-04-26 RX ORDER — HEPARIN SODIUM,PORCINE 10 UNIT/ML
5-20 VIAL (ML) INTRAVENOUS DAILY PRN
Status: CANCELLED | OUTPATIENT
Start: 2024-04-28

## 2024-04-26 RX ORDER — EPINEPHRINE 1 MG/ML
0.3 INJECTION, SOLUTION, CONCENTRATE INTRAVENOUS EVERY 5 MIN PRN
Status: CANCELLED | OUTPATIENT
Start: 2024-04-28

## 2024-04-26 RX ORDER — DIPHENHYDRAMINE HYDROCHLORIDE 50 MG/ML
50 INJECTION INTRAMUSCULAR; INTRAVENOUS
Status: CANCELLED
Start: 2024-04-28

## 2024-04-26 RX ORDER — METHYLPREDNISOLONE SODIUM SUCCINATE 125 MG/2ML
125 INJECTION, POWDER, LYOPHILIZED, FOR SOLUTION INTRAMUSCULAR; INTRAVENOUS
Status: CANCELLED
Start: 2024-04-28

## 2024-04-26 RX ORDER — MEPERIDINE HYDROCHLORIDE 25 MG/ML
25 INJECTION INTRAMUSCULAR; INTRAVENOUS; SUBCUTANEOUS EVERY 30 MIN PRN
Status: CANCELLED | OUTPATIENT
Start: 2024-04-28

## 2024-04-26 RX ORDER — ALBUTEROL SULFATE 0.83 MG/ML
2.5 SOLUTION RESPIRATORY (INHALATION)
Status: CANCELLED | OUTPATIENT
Start: 2024-04-28

## 2024-04-26 RX ORDER — HEPARIN SODIUM (PORCINE) LOCK FLUSH IV SOLN 100 UNIT/ML 100 UNIT/ML
5 SOLUTION INTRAVENOUS
Status: CANCELLED | OUTPATIENT
Start: 2024-04-28

## 2024-04-26 RX ORDER — ALBUTEROL SULFATE 90 UG/1
1-2 AEROSOL, METERED RESPIRATORY (INHALATION)
Status: CANCELLED
Start: 2024-04-28

## 2024-04-26 RX ADMIN — IRON SUCROSE 300 MG: 20 INJECTION, SOLUTION INTRAVENOUS at 12:07

## 2024-04-26 RX ADMIN — SODIUM CHLORIDE 250 ML: 9 INJECTION, SOLUTION INTRAVENOUS at 13:37

## 2024-04-26 ASSESSMENT — PAIN SCALES - GENERAL: PAINLEVEL: NO PAIN (0)

## 2024-04-26 NOTE — PROGRESS NOTES
Infusion Nursing Note:  Kaelyn Reed presents today for Venofer.    Patient seen by provider today: No   present during visit today: Yes, Language: Divehi.     Note: N/A.      Intravenous Access:  Peripheral IV placed.    Treatment Conditions:  Not Applicable.      Post Infusion Assessment:  Patient tolerated infusion without incident.  No evidence of extravasations.  Access discontinued per protocol.       Discharge Plan:   Patient and/or family verbalized understanding of discharge instructions and all questions answered.  Patient discharged in stable condition accompanied by: self.      MICKY PETERS RN

## 2024-04-26 NOTE — PROGRESS NOTES
"Subjective:      23 year old  at 36w2d presents for a routine prenatal appointment.  Denies vaginal bleeding or leakage of fluid.  Denies contractions or cramping.  Pos fetal movement.       No HA, visual changes, RUQ or epigastric pain.   Patient concerns: none, feeling well overall    -  ultrasound EFW 9%, normal dopplers and NST on .  Delivery recommended by 38w-39w.    - Has IV iron scheduled today at 12 and on .  Pt aware of where to go.    Objective:  Vitals:    24 0850   BP: 99/62   Pulse: 76   Weight: 55.4 kg (122 lb 1.6 oz)   Height: 1.473 m (4' 10\")    See OB flowsheet    Assessment/Plan     Patient Active Problem List    Diagnosis Date Noted    Fetal growth restriction antepartum 2024     Priority: Medium    Low maternal weight gain, unspecified trimester 2024     Priority: Medium    Maternal varicella, non-immune 2024     Priority: Medium     Vaccinate postpartum      Abnormal fetal ultrasound 2024     Priority: Medium    Family history of unexplained  death 2024     Priority: Medium     [place]  in NICU after spon  birth in [facility].  Possibly at 28wks, pt unsure of GA and how old baby was they       History of  delivery, currently pregnant 2024     Priority: Medium     [facility],  pt not sure of GA.  [place]  after birth      Inadequate material resources 2024     Priority: Medium     3/22: SW placed      Iron deficiency anemia secondary to inadequate dietary iron intake 2024     Priority: Medium     Infusion orders placed      High-risk pregnancy, unspecified trimester 2023     Priority: Medium     [facility] choice: CNM group  Partner's name: Woody  Employment:none  [x]NOB folder  [x]Dating JAYLEN: 24  [x]Fetal anatomy US ordered  [x] No added risk for PRE-E  [x] No increased risk for GDM  [x]No need for utox in labor  []COVID " vaccine :   [x]Pap 23 NIL    12-23wks________________________  [x]Rubella immune  [x]Hep B immune   []Varicella immune  [x]FLU shot: Declined     24-28wk_________________________  [x]EOB folder  [x]Labor plans: Vaginal, medicated  [x]Infant feeding plan: Unsure  [] care provider choice  [x]PP Contraception: Depo  [x]TDAP Declined       29-35 wk________________________  []TOLAC consent done  []GCT, passed  []RSV    36-37 wks______________________   [] OTC PP meds sent  []PP plans:  []Planning CS-ERAS pkt    38-42 wks______________________  []IOL reason/plans  []Postdates BPP        Positive screening for depression on 9-item Patient Health Questionnaire (PHQ-9) 2023     Priority: Medium     3/22: mental health and social work referral placed             2023     2:24 PM   PHQ-9 SCORE   PHQ-9 Total Score 10         2023     2:24 PM   PHQ   PHQ-9 Total Score 10   Q9: Thoughts of better off dead/self-harm past 2 weeks Not at all     GBS and GC/CT collected  Labor signs discussed. Reinforced daily fetal movement counts.  Reviewed why/how to contact provider if headache/visual changes/RUQ or epigastric pain, decreased fetal movement, vaginal bleeding, leakage of fluid.  Reviewed recommendation for FGR at 38w-39w. Pt agreeable.  Will need IOL scheduled at next visit.  Return to clinic in 1 week and prn if questions or concerns.     FRANKI Hernandez CNM

## 2024-04-26 NOTE — PATIENT INSTRUCTIONS
Thank you for trusting us with your care!     If you need to contact us for questions about:  Symptoms, Scheduling & Medical Questions; Non-urgent (2-3 day response) [employer] message, Urgent (needing response today) [phone] (if after 3:30pm next day response)   Prescriptions: Please call your Pharmacy   Billing: Hilda [phone] or [facility]:[phone]

## 2024-04-27 LAB
C TRACH DNA SPEC QL NAA+PROBE: NEGATIVE
GP B STREP DNA SPEC QL NAA+PROBE: NEGATIVE
N GONORRHOEA DNA SPEC QL NAA+PROBE: NEGATIVE

## 2024-05-02 ENCOUNTER — HOSPITAL ENCOUNTER (OUTPATIENT)
Dept: ULTRASOUND IMAGING | Facility: CLINIC | Age: 24
Discharge: HOME | End: 2024-05-02
Attending: OBSTETRICS & GYNECOLOGY
Payer: MEDICAID

## 2024-05-02 ENCOUNTER — OFFICE VISIT (OUTPATIENT)
Dept: MATERNAL FETAL MEDICINE | Facility: CLINIC | Age: 24
End: 2024-05-02
Attending: OBSTETRICS & GYNECOLOGY
Payer: MEDICAID

## 2024-05-02 VITALS — DIASTOLIC BLOOD PRESSURE: 62 MMHG | SYSTOLIC BLOOD PRESSURE: 100 MMHG

## 2024-05-02 DIAGNOSIS — O36.5990 FETAL GROWTH RESTRICTION ANTEPARTUM: ICD-10-CM

## 2024-05-02 PROCEDURE — 76820 UMBILICAL ARTERY ECHO: CPT | Mod: 26 | Performed by: OBSTETRICS & GYNECOLOGY

## 2024-05-02 PROCEDURE — 76815 OB US LIMITED FETUS(S): CPT | Mod: 26 | Performed by: OBSTETRICS & GYNECOLOGY

## 2024-05-02 PROCEDURE — 76815 OB US LIMITED FETUS(S): CPT

## 2024-05-02 PROCEDURE — 59025 FETAL NON-STRESS TEST: CPT | Mod: 26 | Performed by: OBSTETRICS & GYNECOLOGY

## 2024-05-02 PROCEDURE — 59025 FETAL NON-STRESS TEST: CPT

## 2024-05-02 NOTE — NURSING NOTE
# via ipad. Patient reports positive fetal movement, denies pain, denies contractions, leaking of fluid, or bleeding. NST Performed due to FGR.  Dr. Benítez reviewed efm tracing. See NST/BPP Doc Flowsheet tab.  SBAR given to DAIJA ARTEAGA, see their note in Epic.

## 2024-05-03 ENCOUNTER — PRENATAL OFFICE VISIT (OUTPATIENT)
Dept: OBGYN | Facility: CLINIC | Age: 24
End: 2024-05-03
Attending: ADVANCED PRACTICE MIDWIFE
Payer: MEDICAID

## 2024-05-03 VITALS
HEART RATE: 101 BPM | HEIGHT: 58 IN | BODY MASS INDEX: 25.53 KG/M2 | SYSTOLIC BLOOD PRESSURE: 106 MMHG | WEIGHT: 121.6 LBS | DIASTOLIC BLOOD PRESSURE: 67 MMHG

## 2024-05-03 DIAGNOSIS — D50.8 IRON DEFICIENCY ANEMIA SECONDARY TO INADEQUATE DIETARY IRON INTAKE: ICD-10-CM

## 2024-05-03 DIAGNOSIS — O09.90 HIGH-RISK PREGNANCY, UNSPECIFIED TRIMESTER: Primary | ICD-10-CM

## 2024-05-03 DIAGNOSIS — O36.5990 FETAL GROWTH RESTRICTION ANTEPARTUM: ICD-10-CM

## 2024-05-03 PROCEDURE — G0463 HOSPITAL OUTPT CLINIC VISIT: HCPCS | Performed by: ADVANCED PRACTICE MIDWIFE

## 2024-05-03 PROCEDURE — 59425 ANTEPARTUM CARE ONLY: CPT | Performed by: ADVANCED PRACTICE MIDWIFE

## 2024-05-03 RX ORDER — PRENATAL VIT/IRON FUM/FOLIC AC 27MG-0.8MG
1 TABLET ORAL DAILY
Qty: 90 TABLET | Refills: 3 | Status: SHIPPED | OUTPATIENT
Start: 2024-05-03 | End: 2024-07-12

## 2024-05-03 NOTE — PATIENT INSTRUCTIONS
"Week 37 of Your Pregnancy: Care Instructions    Most babies are born between 37 and 40 weeks.    This is a good time to pack a bag to take with you to the birth. Then it will be ready to go when you are.    Learn about breastfeeding.  For example, find out about ways to hold your baby to make breastfeeding easier. And think about learning how to pump and store milk.     Know that crying is normal.  It's common for babies to cry 1 to 3 hours a day. Some cry more, and some cry less.     Learn why babies cry.  They may be hungry; have gas; need a diaper change; or feel cold, warm, tired, lonely, or tense. Sometimes they cry for unknown reasons.     Think about what will help you stay calm when your baby cries.  Taking slow, deep breaths can help. So can taking a break. It's okay to put your baby somewhere safe (like their crib) and walk away for a few minutes.     Learn about safe sleep for your baby.  Always put your baby to sleep on their back. Place them alone in a crib or bassinet with a firm, flat surface. Keep soft items like stuffed animals out of the crib.     Learn what to expect with  poop.  Your baby will have their own bowel patterns. Some babies have several bowel movements a day. Some have fewer.     Know that  babies will often have loose, yellow bowel movements.  Formula-fed babies have more formed stools. If your baby's poop looks like pellets, your baby is constipated.   Follow-up care is a key part of your treatment and safety. Be sure to make and go to all appointments, and call your doctor if you are having problems. It's also a good idea to know your test results and keep a list of the medicines you take.  Where can you learn more?  Go to https://www.[employer].net/patiented  Enter N257 in the search box to learn more about \"Week 37 of Your Pregnancy: Care Instructions.\"  Current as of: July 10, 2023               Content Version: 14.0    3068-3569 [employer]wise[employer].   Care " instructions adapted under license by your healthcare professional. If you have questions about a medical condition or this instruction, always ask your healthcare professional. Healthwise, Incorporated disclaims any warranty or liability for your use of this information.    Thank you for trusting us with your care!     If you need to contact us for questions about:  Symptoms, Scheduling & Medical Questions; Non-urgent (2-3 day response) [employer] message, Urgent (needing response today) [phone] (if after 3:30pm next day response)   Prescriptions: Please call your Pharmacy   Billing: Hilda [phone] or [facility]:[phone]

## 2024-05-03 NOTE — PROGRESS NOTES
"Subjective:     23 year old  at 37w2d presents for routine prenatal visit. Use of  services.          Denies cramping/contractions, vaginal bleeding, discharge or leakage of fluid. Reports +fetal movement.  No HA, vision changes, ruq/epigastric pain.      Patient concerns: Feeling well overall. Pregnancy c/b fetal growth restriction. Induction of labor has been recommended by MFM 38-39 weeks. Pt is interested in scheduling IOL for Friday next week (38+2).     Has IV iron infusions scheduled.     Objective:  Vitals:    24 1204   BP: 106/67   Pulse: 101   Weight: 55.2 kg (121 lb 9.6 oz)   Height: 1.473 m (4' 10\")        See OB flowsheet    VSCAN at bedside confirmed cephalic    Assessment/Plan     Encounter Diagnoses   Name Primary?    High-risk pregnancy, unspecified trimester Yes    Iron deficiency anemia secondary to inadequate dietary iron intake     Fetal growth restriction antepartum        Orders Placed This Encounter   Medications    Prenatal Vit-Fe Fumarate-FA (PRENATAL MULTIVITAMIN W/IRON) 27-0.8 MG tablet     Sig: Take 1 tablet by mouth daily     Dispense:  90 tablet     Refill:  3    acetaminophen (TYLENOL) 325 MG tablet     Sig: Take 2 tablets (650 mg) by mouth every 6 hours as needed for mild pain Start after Delivery.     Dispense:  100 tablet     Refill:  0    ibuprofen (ADVIL/MOTRIN) 600 MG tablet     Sig: Take 1 tablet (600 mg) by mouth every 6 hours as needed for moderate pain Start after delivery     Dispense:  60 tablet     Refill:  0    senna-docusate (SENOKOT-S/PERICOLACE) 8.6-50 MG tablet     Sig: Take 1 tablet by mouth daily Start after delivery.     Dispense:  100 tablet     Refill:  0       - Reviewed why/how to contact provider if headache/visual changes/RUQ or epigastric pain, decreased fetal movement, vaginal bleeding, leakage of fluid or strong/regular contractions.   Patient education/orders or handouts today:  Sign/symptoms of labor, When to call for labor or " other concerns, and Induction of labor    - Reviewed recommended postpartum OTC medication. Pt desires prescription. Prescription for tylenol, motrin and senna-docusate sent to pt's preferred pharmacy. Reviewed no use of motrin during pregnancy.   - GBS negative.   - IV Iron infusions scheduled 5/3, 5/8.   - Reviewed [facility] recommendations for IOL d/t FGR.  Discussed cervical ripening and induction process, timing, methods.   - IOL scheduled for 5/10/24 ar 7:30 pm, 38+2.   Instructions and directions given.    - Next [facility] ultrasound on 5/10.     FRANKI Beasley, CNM

## 2024-05-06 PROBLEM — O28.3 ABNORMAL FETAL ULTRASOUND: Status: RESOLVED | Noted: 2024-03-22 | Resolved: 2024-05-06

## 2024-05-06 RX ORDER — ACETAMINOPHEN 325 MG/1
650 TABLET ORAL EVERY 6 HOURS PRN
Qty: 100 TABLET | Refills: 0 | Status: ON HOLD | OUTPATIENT
Start: 2024-05-06 | End: 2024-05-13

## 2024-05-06 RX ORDER — AMOXICILLIN 250 MG
1 CAPSULE ORAL DAILY
Qty: 100 TABLET | Refills: 0 | Status: ON HOLD | OUTPATIENT
Start: 2024-05-06 | End: 2024-05-13

## 2024-05-06 RX ORDER — IBUPROFEN 600 MG/1
600 TABLET, FILM COATED ORAL EVERY 6 HOURS PRN
Qty: 60 TABLET | Refills: 0 | Status: ON HOLD | OUTPATIENT
Start: 2024-05-06 | End: 2024-05-13

## 2024-05-08 ENCOUNTER — INFUSION THERAPY VISIT (OUTPATIENT)
Dept: INFUSION THERAPY | Facility: CLINIC | Age: 24
End: 2024-05-08
Attending: MIDWIFE
Payer: MEDICAID

## 2024-05-08 VITALS
OXYGEN SATURATION: 98 % | DIASTOLIC BLOOD PRESSURE: 51 MMHG | SYSTOLIC BLOOD PRESSURE: 89 MMHG | HEART RATE: 80 BPM | RESPIRATION RATE: 16 BRPM

## 2024-05-08 DIAGNOSIS — O09.90 HIGH-RISK PREGNANCY, UNSPECIFIED TRIMESTER: Primary | ICD-10-CM

## 2024-05-08 DIAGNOSIS — D50.8 IRON DEFICIENCY ANEMIA SECONDARY TO INADEQUATE DIETARY IRON INTAKE: ICD-10-CM

## 2024-05-08 PROCEDURE — 96365 THER/PROPH/DIAG IV INF INIT: CPT

## 2024-05-08 PROCEDURE — 258N000003 HC RX IP 258 OP 636: Performed by: MIDWIFE

## 2024-05-08 PROCEDURE — 250N000011 HC RX IP 250 OP 636: Performed by: MIDWIFE

## 2024-05-08 PROCEDURE — 96366 THER/PROPH/DIAG IV INF ADDON: CPT

## 2024-05-08 RX ORDER — ALBUTEROL SULFATE 90 UG/1
1-2 AEROSOL, METERED RESPIRATORY (INHALATION)
Start: 2024-05-08

## 2024-05-08 RX ORDER — HEPARIN SODIUM (PORCINE) LOCK FLUSH IV SOLN 100 UNIT/ML 100 UNIT/ML
5 SOLUTION INTRAVENOUS
OUTPATIENT
Start: 2024-05-08

## 2024-05-08 RX ORDER — MEPERIDINE HYDROCHLORIDE 25 MG/ML
25 INJECTION INTRAMUSCULAR; INTRAVENOUS; SUBCUTANEOUS EVERY 30 MIN PRN
OUTPATIENT
Start: 2024-05-08

## 2024-05-08 RX ORDER — HEPARIN SODIUM,PORCINE 10 UNIT/ML
5-20 VIAL (ML) INTRAVENOUS DAILY PRN
OUTPATIENT
Start: 2024-05-08

## 2024-05-08 RX ORDER — EPINEPHRINE 1 MG/ML
0.3 INJECTION, SOLUTION, CONCENTRATE INTRAVENOUS EVERY 5 MIN PRN
OUTPATIENT
Start: 2024-05-08

## 2024-05-08 RX ORDER — DIPHENHYDRAMINE HYDROCHLORIDE 50 MG/ML
50 INJECTION INTRAMUSCULAR; INTRAVENOUS
Start: 2024-05-08

## 2024-05-08 RX ORDER — ALBUTEROL SULFATE 0.83 MG/ML
2.5 SOLUTION RESPIRATORY (INHALATION)
OUTPATIENT
Start: 2024-05-08

## 2024-05-08 RX ORDER — METHYLPREDNISOLONE SODIUM SUCCINATE 125 MG/2ML
125 INJECTION, POWDER, LYOPHILIZED, FOR SOLUTION INTRAMUSCULAR; INTRAVENOUS
Start: 2024-05-08

## 2024-05-08 RX ADMIN — IRON SUCROSE 300 MG: 20 INJECTION, SOLUTION INTRAVENOUS at 12:37

## 2024-05-08 ASSESSMENT — PAIN SCALES - GENERAL: PAINLEVEL: NO PAIN (0)

## 2024-05-08 NOTE — PROGRESS NOTES
Infusion Nursing Note:  Kaelyn Reed presents today for Venofer.    Patient seen by provider today: No   present during visit today: Yes, Language: Faroese.     Note: N/A.      Intravenous Access:  Peripheral IV placed.    Treatment Conditions:  Not Applicable.      Post Infusion Assessment:  Patient tolerated infusion without incident.  No evidence of extravasations.  Access discontinued per protocol.       Discharge Plan:   Patient and/or family verbalized understanding of discharge instructions and all questions answered.  Patient discharged in stable condition accompanied by: self.      MICKY PETERS RN

## 2024-05-10 ENCOUNTER — HOSPITAL ENCOUNTER (OUTPATIENT)
Dept: ULTRASOUND IMAGING | Facility: CLINIC | Age: 24
Discharge: HOME | End: 2024-05-10
Attending: OBSTETRICS & GYNECOLOGY
Payer: MEDICAID

## 2024-05-10 ENCOUNTER — OFFICE VISIT (OUTPATIENT)
Dept: MATERNAL FETAL MEDICINE | Facility: CLINIC | Age: 24
End: 2024-05-10
Attending: OBSTETRICS & GYNECOLOGY
Payer: MEDICAID

## 2024-05-10 ENCOUNTER — HOSPITAL ENCOUNTER (INPATIENT)
Facility: CLINIC | Age: 24
LOS: 3 days | Discharge: HOME | End: 2024-05-13
Attending: REGISTERED NURSE | Admitting: REGISTERED NURSE
Payer: MEDICAID

## 2024-05-10 VITALS — SYSTOLIC BLOOD PRESSURE: 104 MMHG | DIASTOLIC BLOOD PRESSURE: 57 MMHG

## 2024-05-10 DIAGNOSIS — D50.8 IRON DEFICIENCY ANEMIA SECONDARY TO INADEQUATE DIETARY IRON INTAKE: ICD-10-CM

## 2024-05-10 DIAGNOSIS — O36.5990 FETAL GROWTH RESTRICTION ANTEPARTUM: ICD-10-CM

## 2024-05-10 PROBLEM — Z34.90 ENCOUNTER FOR INDUCTION OF LABOR: Status: ACTIVE | Noted: 2024-05-10

## 2024-05-10 LAB
ABO/RH(D): NORMAL
ANTIBODY SCREEN: NEGATIVE
ERYTHROCYTE [DISTWIDTH] IN BLOOD BY AUTOMATED COUNT: 23.1 % (ref 10–15)
HCT VFR BLD AUTO: 35 % (ref 35–47)
HGB BLD-MCNC: 11.5 G/DL (ref 11.7–15.7)
MCH RBC QN AUTO: 26 PG (ref 26.5–33)
MCHC RBC AUTO-ENTMCNC: 32.9 G/DL (ref 31.5–36.5)
MCV RBC AUTO: 79 FL (ref 78–100)
PLATELET # BLD AUTO: 268 10E3/UL (ref 150–450)
RBC # BLD AUTO: 4.42 10E6/UL (ref 3.8–5.2)
SPECIMEN EXPIRATION DATE: NORMAL
WBC # BLD AUTO: 8.5 10E3/UL (ref 4–11)

## 2024-05-10 PROCEDURE — 120N000002 HC R&B MED SURG/OB UMMC

## 2024-05-10 PROCEDURE — 76816 OB US FOLLOW-UP PER FETUS: CPT

## 2024-05-10 PROCEDURE — 250N000013 HC RX MED GY IP 250 OP 250 PS 637: Performed by: REGISTERED NURSE

## 2024-05-10 PROCEDURE — 76820 UMBILICAL ARTERY ECHO: CPT | Mod: 26 | Performed by: OBSTETRICS & GYNECOLOGY

## 2024-05-10 PROCEDURE — 86900 BLOOD TYPING SEROLOGIC ABO: CPT | Performed by: REGISTERED NURSE

## 2024-05-10 PROCEDURE — 86780 TREPONEMA PALLIDUM: CPT | Performed by: REGISTERED NURSE

## 2024-05-10 PROCEDURE — 85027 COMPLETE CBC AUTOMATED: CPT | Performed by: REGISTERED NURSE

## 2024-05-10 PROCEDURE — 76816 OB US FOLLOW-UP PER FETUS: CPT | Mod: 26 | Performed by: OBSTETRICS & GYNECOLOGY

## 2024-05-10 PROCEDURE — 59025 FETAL NON-STRESS TEST: CPT | Mod: 26 | Performed by: OBSTETRICS & GYNECOLOGY

## 2024-05-10 PROCEDURE — 59025 FETAL NON-STRESS TEST: CPT

## 2024-05-10 RX ORDER — KETOROLAC TROMETHAMINE 30 MG/ML
30 INJECTION, SOLUTION INTRAMUSCULAR; INTRAVENOUS
Status: DISCONTINUED | OUTPATIENT
Start: 2024-05-10 | End: 2024-05-13 | Stop reason: HOSPADM

## 2024-05-10 RX ORDER — NALOXONE HYDROCHLORIDE 0.4 MG/ML
0.2 INJECTION, SOLUTION INTRAMUSCULAR; INTRAVENOUS; SUBCUTANEOUS
Status: DISCONTINUED | OUTPATIENT
Start: 2024-05-10 | End: 2024-05-12 | Stop reason: HOSPADM

## 2024-05-10 RX ORDER — CARBOPROST TROMETHAMINE 250 UG/ML
250 INJECTION, SOLUTION INTRAMUSCULAR
Status: DISCONTINUED | OUTPATIENT
Start: 2024-05-10 | End: 2024-05-12 | Stop reason: HOSPADM

## 2024-05-10 RX ORDER — NALOXONE HYDROCHLORIDE 0.4 MG/ML
0.4 INJECTION, SOLUTION INTRAMUSCULAR; INTRAVENOUS; SUBCUTANEOUS
Status: DISCONTINUED | OUTPATIENT
Start: 2024-05-10 | End: 2024-05-12 | Stop reason: HOSPADM

## 2024-05-10 RX ORDER — FENTANYL CITRATE 50 UG/ML
100 INJECTION, SOLUTION INTRAMUSCULAR; INTRAVENOUS
Status: DISCONTINUED | OUTPATIENT
Start: 2024-05-10 | End: 2024-05-12 | Stop reason: HOSPADM

## 2024-05-10 RX ORDER — OXYTOCIN/0.9 % SODIUM CHLORIDE 30/500 ML
340 PLASTIC BAG, INJECTION (ML) INTRAVENOUS CONTINUOUS PRN
Status: DISCONTINUED | OUTPATIENT
Start: 2024-05-10 | End: 2024-05-12 | Stop reason: HOSPADM

## 2024-05-10 RX ORDER — PENICILLIN G POTASSIUM 5000000 [IU]/1
5 INJECTION, POWDER, FOR SOLUTION INTRAMUSCULAR; INTRAVENOUS ONCE
Status: COMPLETED | OUTPATIENT
Start: 2024-05-10 | End: 2024-05-11

## 2024-05-10 RX ORDER — PROCHLORPERAZINE MALEATE 10 MG
10 TABLET ORAL EVERY 6 HOURS PRN
Status: DISCONTINUED | OUTPATIENT
Start: 2024-05-10 | End: 2024-05-12 | Stop reason: HOSPADM

## 2024-05-10 RX ORDER — OXYTOCIN 10 [USP'U]/ML
10 INJECTION, SOLUTION INTRAMUSCULAR; INTRAVENOUS
Status: DISCONTINUED | OUTPATIENT
Start: 2024-05-10 | End: 2024-05-12 | Stop reason: HOSPADM

## 2024-05-10 RX ORDER — ONDANSETRON 4 MG/1
4 TABLET, ORALLY DISINTEGRATING ORAL EVERY 6 HOURS PRN
Status: DISCONTINUED | OUTPATIENT
Start: 2024-05-10 | End: 2024-05-12 | Stop reason: HOSPADM

## 2024-05-10 RX ORDER — OXYTOCIN 10 [USP'U]/ML
10 INJECTION, SOLUTION INTRAMUSCULAR; INTRAVENOUS
Status: DISCONTINUED | OUTPATIENT
Start: 2024-05-10 | End: 2024-05-13 | Stop reason: HOSPADM

## 2024-05-10 RX ORDER — CITRIC ACID/SODIUM CITRATE 334-500MG
30 SOLUTION, ORAL ORAL
Status: DISCONTINUED | OUTPATIENT
Start: 2024-05-10 | End: 2024-05-12 | Stop reason: HOSPADM

## 2024-05-10 RX ORDER — ONDANSETRON 2 MG/ML
4 INJECTION INTRAMUSCULAR; INTRAVENOUS EVERY 6 HOURS PRN
Status: DISCONTINUED | OUTPATIENT
Start: 2024-05-10 | End: 2024-05-12 | Stop reason: HOSPADM

## 2024-05-10 RX ORDER — MISOPROSTOL 100 UG/1
25 TABLET ORAL EVERY 4 HOURS PRN
Status: DISCONTINUED | OUTPATIENT
Start: 2024-05-10 | End: 2024-05-12 | Stop reason: HOSPADM

## 2024-05-10 RX ORDER — METOCLOPRAMIDE 10 MG/1
10 TABLET ORAL EVERY 6 HOURS PRN
Status: DISCONTINUED | OUTPATIENT
Start: 2024-05-10 | End: 2024-05-12 | Stop reason: HOSPADM

## 2024-05-10 RX ORDER — METOCLOPRAMIDE HYDROCHLORIDE 5 MG/ML
10 INJECTION INTRAMUSCULAR; INTRAVENOUS EVERY 6 HOURS PRN
Status: DISCONTINUED | OUTPATIENT
Start: 2024-05-10 | End: 2024-05-12 | Stop reason: HOSPADM

## 2024-05-10 RX ORDER — HYDROXYZINE HYDROCHLORIDE 50 MG/1
50 TABLET, FILM COATED ORAL
Status: DISCONTINUED | OUTPATIENT
Start: 2024-05-10 | End: 2024-05-12 | Stop reason: HOSPADM

## 2024-05-10 RX ORDER — LOPERAMIDE HCL 2 MG
2 CAPSULE ORAL
Status: DISCONTINUED | OUTPATIENT
Start: 2024-05-10 | End: 2024-05-12 | Stop reason: HOSPADM

## 2024-05-10 RX ORDER — PENICILLIN G 3000000 [IU]/50ML
3 INJECTION, SOLUTION INTRAVENOUS EVERY 4 HOURS
Status: DISCONTINUED | OUTPATIENT
Start: 2024-05-10 | End: 2024-05-12 | Stop reason: HOSPADM

## 2024-05-10 RX ORDER — TRANEXAMIC ACID 10 MG/ML
1 INJECTION, SOLUTION INTRAVENOUS EVERY 30 MIN PRN
Status: DISCONTINUED | OUTPATIENT
Start: 2024-05-10 | End: 2024-05-12 | Stop reason: HOSPADM

## 2024-05-10 RX ORDER — IBUPROFEN 800 MG/1
800 TABLET, FILM COATED ORAL
Status: DISCONTINUED | OUTPATIENT
Start: 2024-05-10 | End: 2024-05-13 | Stop reason: HOSPADM

## 2024-05-10 RX ORDER — MISOPROSTOL 200 UG/1
400 TABLET ORAL
Status: DISCONTINUED | OUTPATIENT
Start: 2024-05-10 | End: 2024-05-12 | Stop reason: HOSPADM

## 2024-05-10 RX ORDER — PROCHLORPERAZINE 25 MG
25 SUPPOSITORY, RECTAL RECTAL EVERY 12 HOURS PRN
Status: DISCONTINUED | OUTPATIENT
Start: 2024-05-10 | End: 2024-05-12 | Stop reason: HOSPADM

## 2024-05-10 RX ORDER — MORPHINE SULFATE 10 MG/ML
10 INJECTION, SOLUTION INTRAMUSCULAR; INTRAVENOUS
Status: DISCONTINUED | OUTPATIENT
Start: 2024-05-10 | End: 2024-05-12 | Stop reason: HOSPADM

## 2024-05-10 RX ORDER — MISOPROSTOL 200 UG/1
800 TABLET ORAL
Status: DISCONTINUED | OUTPATIENT
Start: 2024-05-10 | End: 2024-05-12 | Stop reason: HOSPADM

## 2024-05-10 RX ORDER — LOPERAMIDE HCL 2 MG
4 CAPSULE ORAL
Status: DISCONTINUED | OUTPATIENT
Start: 2024-05-10 | End: 2024-05-12 | Stop reason: HOSPADM

## 2024-05-10 RX ORDER — METHYLERGONOVINE MALEATE 0.2 MG/ML
200 INJECTION INTRAVENOUS
Status: DISCONTINUED | OUTPATIENT
Start: 2024-05-10 | End: 2024-05-12 | Stop reason: HOSPADM

## 2024-05-10 RX ORDER — OXYTOCIN/0.9 % SODIUM CHLORIDE 30/500 ML
100-340 PLASTIC BAG, INJECTION (ML) INTRAVENOUS CONTINUOUS PRN
Status: DISCONTINUED | OUTPATIENT
Start: 2024-05-10 | End: 2024-05-13 | Stop reason: HOSPADM

## 2024-05-10 RX ADMIN — MISOPROSTOL 25 MCG: 100 TABLET ORAL at 20:48

## 2024-05-10 ASSESSMENT — ACTIVITIES OF DAILY LIVING (ADL)
ADLS_ACUITY_SCORE: 18

## 2024-05-10 NOTE — NURSING NOTE
Patient states positive fetal movement, denies contractions today. Denies concerns before ultrasound. Patient has her IOL scheduled for this evening at 1930, states she knows where to go. NST Performed due to FGR.   reviewed efm tracing. See NST/BPP Doc Flowsheet tab.

## 2024-05-11 ENCOUNTER — ANESTHESIA EVENT (OUTPATIENT)
Dept: OBGYN | Facility: CLINIC | Age: 24
End: 2024-05-11
Payer: MEDICAID

## 2024-05-11 ENCOUNTER — ANESTHESIA (OUTPATIENT)
Dept: OBGYN | Facility: CLINIC | Age: 24
End: 2024-05-11
Payer: MEDICAID

## 2024-05-11 LAB — T PALLIDUM AB SER QL: NONREACTIVE

## 2024-05-11 PROCEDURE — 250N000011 HC RX IP 250 OP 636: Performed by: REGISTERED NURSE

## 2024-05-11 PROCEDURE — 258N000003 HC RX IP 258 OP 636: Performed by: REGISTERED NURSE

## 2024-05-11 PROCEDURE — 3E0R3BZ INTRODUCTION OF ANESTHETIC AGENT INTO SPINAL CANAL, PERCUTANEOUS APPROACH: ICD-10-PCS | Performed by: ANESTHESIOLOGY

## 2024-05-11 PROCEDURE — 370N000003 HC ANESTHESIA WARD SERVICE

## 2024-05-11 PROCEDURE — 250N000009 HC RX 250: Performed by: ADVANCED PRACTICE MIDWIFE

## 2024-05-11 PROCEDURE — 120N000002 HC R&B MED SURG/OB UMMC

## 2024-05-11 PROCEDURE — 258N000003 HC RX IP 258 OP 636: Performed by: ADVANCED PRACTICE MIDWIFE

## 2024-05-11 PROCEDURE — 250N000013 HC RX MED GY IP 250 OP 250 PS 637: Performed by: REGISTERED NURSE

## 2024-05-11 PROCEDURE — 00HU33Z INSERTION OF INFUSION DEVICE INTO SPINAL CANAL, PERCUTANEOUS APPROACH: ICD-10-PCS | Performed by: ANESTHESIOLOGY

## 2024-05-11 PROCEDURE — 250N000011 HC RX IP 250 OP 636

## 2024-05-11 RX ORDER — OXYTOCIN/0.9 % SODIUM CHLORIDE 30/500 ML
PLASTIC BAG, INJECTION (ML) INTRAVENOUS
Status: DISCONTINUED
Start: 2024-05-11 | End: 2024-05-11 | Stop reason: HOSPADM

## 2024-05-11 RX ORDER — LIDOCAINE 40 MG/G
CREAM TOPICAL
Status: DISCONTINUED | OUTPATIENT
Start: 2024-05-11 | End: 2024-05-12 | Stop reason: HOSPADM

## 2024-05-11 RX ORDER — OXYTOCIN/0.9 % SODIUM CHLORIDE 30/500 ML
1-24 PLASTIC BAG, INJECTION (ML) INTRAVENOUS CONTINUOUS
Status: DISCONTINUED | OUTPATIENT
Start: 2024-05-11 | End: 2024-05-12 | Stop reason: HOSPADM

## 2024-05-11 RX ORDER — NALBUPHINE HYDROCHLORIDE 20 MG/ML
2.5-5 INJECTION, SOLUTION INTRAMUSCULAR; INTRAVENOUS; SUBCUTANEOUS EVERY 6 HOURS PRN
Status: DISCONTINUED | OUTPATIENT
Start: 2024-05-11 | End: 2024-05-13 | Stop reason: HOSPADM

## 2024-05-11 RX ORDER — FENTANYL/ROPIVACAINE/NS/PF 2MCG/ML-.1
PLASTIC BAG, INJECTION (ML) EPIDURAL
Status: DISCONTINUED | OUTPATIENT
Start: 2024-05-11 | End: 2024-05-12 | Stop reason: HOSPADM

## 2024-05-11 RX ORDER — SODIUM CHLORIDE, SODIUM LACTATE, POTASSIUM CHLORIDE, CALCIUM CHLORIDE 600; 310; 30; 20 MG/100ML; MG/100ML; MG/100ML; MG/100ML
INJECTION, SOLUTION INTRAVENOUS CONTINUOUS PRN
Status: DISCONTINUED | OUTPATIENT
Start: 2024-05-11 | End: 2024-05-12 | Stop reason: HOSPADM

## 2024-05-11 RX ORDER — FENTANYL CITRATE-0.9 % NACL/PF 10 MCG/ML
100 PLASTIC BAG, INJECTION (ML) INTRAVENOUS EVERY 5 MIN PRN
Status: DISCONTINUED | OUTPATIENT
Start: 2024-05-11 | End: 2024-05-12 | Stop reason: HOSPADM

## 2024-05-11 RX ORDER — MISOPROSTOL 200 UG/1
TABLET ORAL
Status: DISCONTINUED
Start: 2024-05-11 | End: 2024-05-11 | Stop reason: HOSPADM

## 2024-05-11 RX ORDER — OXYTOCIN 10 [USP'U]/ML
INJECTION, SOLUTION INTRAMUSCULAR; INTRAVENOUS
Status: DISCONTINUED
Start: 2024-05-11 | End: 2024-05-11 | Stop reason: HOSPADM

## 2024-05-11 RX ORDER — LIDOCAINE HYDROCHLORIDE 10 MG/ML
INJECTION, SOLUTION EPIDURAL; INFILTRATION; INTRACAUDAL; PERINEURAL
Status: DISCONTINUED
Start: 2024-05-11 | End: 2024-05-11 | Stop reason: HOSPADM

## 2024-05-11 RX ADMIN — PENICILLIN G 3 MILLION UNITS: 3000000 INJECTION, SOLUTION INTRAVENOUS at 20:01

## 2024-05-11 RX ADMIN — SODIUM CHLORIDE, POTASSIUM CHLORIDE, SODIUM LACTATE AND CALCIUM CHLORIDE 125 ML/HR: 600; 310; 30; 20 INJECTION, SOLUTION INTRAVENOUS at 15:11

## 2024-05-11 RX ADMIN — SODIUM CHLORIDE, POTASSIUM CHLORIDE, SODIUM LACTATE AND CALCIUM CHLORIDE: 600; 310; 30; 20 INJECTION, SOLUTION INTRAVENOUS at 21:23

## 2024-05-11 RX ADMIN — SODIUM CHLORIDE, POTASSIUM CHLORIDE, SODIUM LACTATE AND CALCIUM CHLORIDE 250 ML: 600; 310; 30; 20 INJECTION, SOLUTION INTRAVENOUS at 03:33

## 2024-05-11 RX ADMIN — PENICILLIN G POTASSIUM 5 MILLION UNITS: 5000000 POWDER, FOR SOLUTION INTRAMUSCULAR; INTRAPLEURAL; INTRATHECAL; INTRAVENOUS at 11:27

## 2024-05-11 RX ADMIN — Medication: at 21:17

## 2024-05-11 RX ADMIN — HYDROXYZINE HYDROCHLORIDE 50 MG: 50 TABLET, FILM COATED ORAL at 03:30

## 2024-05-11 RX ADMIN — PENICILLIN G 3 MILLION UNITS: 3000000 INJECTION, SOLUTION INTRAVENOUS at 16:06

## 2024-05-11 RX ADMIN — SODIUM CHLORIDE, POTASSIUM CHLORIDE, SODIUM LACTATE AND CALCIUM CHLORIDE 250 ML: 600; 310; 30; 20 INJECTION, SOLUTION INTRAVENOUS at 00:27

## 2024-05-11 RX ADMIN — Medication 1 MILLI-UNITS/MIN: at 15:19

## 2024-05-11 RX ADMIN — SODIUM CHLORIDE, POTASSIUM CHLORIDE, SODIUM LACTATE AND CALCIUM CHLORIDE: 600; 310; 30; 20 INJECTION, SOLUTION INTRAVENOUS at 23:10

## 2024-05-11 ASSESSMENT — ACTIVITIES OF DAILY LIVING (ADL)
ADLS_ACUITY_SCORE: 18

## 2024-05-11 NOTE — PROGRESS NOTES
"Labor Progress Note    SUBJECTIVE:    Kaelyn Reed  Estimated Date of Delivery: May 22, 2024  Patient is coping well with contractions. Starting to feel more contractions. Able to rest between contractions.     OBJECTIVE:  VITALS  Blood pressure 93/51, pulse 77, temperature 98.2  F (36.8  C), temperature source Oral, resp. rate 16, height 1.473 m (4' 9.99\"), weight 55.3 kg (122 lb), not currently breastfeeding.  Patient Vitals for the past 24 hrs:   BP Temp Temp src Pulse Resp Height Weight   05/10/24 2322 93/51 98.2  F (36.8  C) Oral -- 16 -- --   05/10/24 1928 110/63 98.3  F (36.8  C) Oral 77 15 1.473 m (4' 9.99\") 55.3 kg (122 lb)       FETAL HEART RATE ASSESSMENT:  Baseline rate 135, normal  Variability moderate  Accelerations present   Decelerations not present       EFM interpretation suggests absence of concern for fetal metabolic acidemia at this time due to accelerations present and decelerations absent  The interventions currently taken to improve the fetal heart rate tracing and fetal oxygenation are:     CONTRACTIONS: Contractions every 1-3 minutes.     Pelvic exam: deferred.   Membrane status: not ruptured      Pitocin- none,  Antibiotics- PCN - to start with active labor.     ASSESSMENT:    Kaelyn Reed  23 year old  female  Estimated Date of Delivery: May 22, 2024  IUP @ 38w3d IOL    Patient Active Problem List   Diagnosis    Positive screening for depression on 9-item Patient Health Questionnaire (PHQ-9)    High-risk pregnancy, unspecified trimester    Family history of unexplained  death    History of  delivery, currently pregnant    Inadequate material resources    Iron deficiency anemia secondary to inadequate dietary iron intake    Maternal varicella, non-immune    Low maternal weight gain, unspecified trimester    Fetal growth restriction antepartum- IOL 5/10 @ 1930-    Encounter for induction of labor       Fetal Heart Rate Tracing category one over " the last 60 minutes    GBS- positive- not treated until active labor    PLAN:  ==========  - Sadi too frequently for another dose of PV misoprostol. Continue to monitor. S/p 250mL fluid bolus. Will give another 250mL fluid bolus. Plan PV miso if contractions space otherwise continue with expectant management at this time.   - Vistaril now per patient request for sleep.   - Continue frequent position changes  - Continue labor support    FRANKI Mckinney CNM

## 2024-05-11 NOTE — PROGRESS NOTES
"SUBJECTIVE:  ==============  General appearance: comfortable, on birthing ball.    Support: not currently present    OBJECTIVE:  ==============  VITALS  Blood pressure 103/60, pulse 77, temperature 98.6  F (37  C), temperature source Oral, resp. rate (P) 16, height 1.473 m (4' 9.99\"), weight 55.3 kg (122 lb), not currently breastfeeding.    FETAL HEART RATE ASSESSMENT:  Baseline rate 130, normal  Variability moderate  Accelerations present   Decelerations not present       CONTRACTIONS: Contractions every 2-5 minutes.  Palpate: moderate  Pitocin- none,  Antibiotics- PCN    ROM: not ruptured  PELVIC EXAM: 4-5/50/-1, mid, soft; bloody show noted    ASSESSMENT:  ==============  Kaelyn MILAD Reed  23 year old  female  Estimated Date of Delivery: May 22, 2024  IUP @ 38w3d IOL FGR   Fetal Heart Rate Tracing category one over the last 60 minutes  GBS- positive- antibiotics started    Patient Active Problem List   Diagnosis    Positive screening for depression on 9-item Patient Health Questionnaire (PHQ-9)    High-risk pregnancy, unspecified trimester    Family history of unexplained  death    History of  delivery, currently pregnant    Inadequate material resources    Iron deficiency anemia secondary to inadequate dietary iron intake    Maternal varicella, non-immune    Low maternal weight gain, unspecified trimester    Fetal growth restriction antepartum- IOL 5/10 @ 1930-    Encounter for induction of labor     PLAN:  ===========  - Ambulation, hydration, position changes, birthing ball/sling and tub options to facilitate labor.  - Pain medication- planning epidural  - Labor augmentation with Pitocin risks and benefits reviewed with pt. Agreeable to plan.   - Reevaluate as clinically indicated    FRANKI Hernandez CNM    "

## 2024-05-11 NOTE — H&P
ADMIT NOTE  =================  38w2d    Kaelyn Reed is a 23 year old female with an No LMP recorded. Patient is pregnant. and Estimated Date of Delivery: May 22, 2024 [facility]24 at 7:27 PM for induction of labor.  Indication: FGR.     HPI  ================  Patient presents for scheduled IOL for fetal growth restriction.     Denies fever, cough, SOB or chest pain. Denies having contact with anyone who is Covid-19 positive.     Contractions- none  Fetal movement- active  ROM- no.  Vaginal bleeding- none  GBS- positive- not treated until active labor  FOB- is involved, Woody    Weight gain- 121 - 114 lbs, Total weight gain- 7 lbs  Height- 4ft 10in  BMI- 23  First prenatal visit at 10+2 weeks, Total visits- 4    PROBLEM LIST  =================  Patient Active Problem List    Diagnosis Date Noted    Fetal growth restriction antepartum- IOL 5/10 @ 193- 2024     Priority: Medium     Fetal growth restriction-  ultrasound EFW 9%   IOL 5/10 @ - 38+2       Low maternal weight gain, unspecified trimester 2024     Priority: Medium    Maternal varicella, non-immune 2024     Priority: Medium     Vaccinate postpartum      Family history of unexplained  death 2024     Priority: Medium     [place]  in NICU after spon  birth in [facility].  Possibly at 28wks, pt unsure of GA and how old baby was they       History of  delivery, currently pregnant 2024     Priority: Medium     [facility],  pt not sure of GA.  [place]  after birth      Inadequate material resources 2024     Priority: Medium     3/22: SW placed      Iron deficiency anemia secondary to inadequate dietary iron intake 2024     Priority: Medium     Infusion orders placed      High-risk pregnancy, unspecified trimester 2023     Priority: Medium     [facility] Provider Group choice: [facility]TON[facility]  group  Partner's name: Woody  Employment:none  [x]NOB folder  [x]Dating JAYLEN: 24  [x]Fetal anatomy US ordered  [x] No added risk for PRE-E  [x] No increased risk for GDM  [x]No need for utox in labor  []COVID vaccine :   [x]Pap 23 NIL    12-23wks________________________  [x]Rubella immune  [x]Hep B immune   []Varicella immune  [x]FLU shot: Declined     24-28wk_________________________  [x]EOB folder  [x]Labor plans: Vaginal, medicated  [x]Infant feeding plan: Unsure  [][place] care provider choice  [x]PP Contraception: Depo  [x]TDAP Declined       29-35 wk________________________    [x]GCT, passed      36-37 wks______________________   [x] OTC PP meds sent  []PP plans:  []Planning CS-ERAS pkt- NA    38-42 wks______________________  [x]IOL reason/plans- FGR IOL 5/10 @ 1930-  []Postdates BPP        Positive screening for depression on 9-item Patient Health Questionnaire (PHQ-9) 2023     Priority: Medium     3/22: mental health and social work referral placed         HISTORIES  ============  No Known Allergies  No past medical history on file.  No past surgical history on file..  Family History   Problem Relation Age of Onset    No Known Problems Mother     No Known Problems Father     No Known Problems Sister     No Known Problems Sister     No Known Problems Sister     No Known Problems Sister     No Known Problems Brother     No Known Problems Brother     No Known Problems Brother     No Known Problems Brother     No Known Problems Brother     No Known Problems Maternal Grandmother     No Known Problems Maternal Grandfather     No Known Problems Paternal Grandmother     No Known Problems Paternal Grandfather     Diabetes No family hx of     Coronary Artery Disease No family hx of     Breast Cancer No family hx of     Genetic Disorder No family hx of      Social History     Tobacco Use    Smoking status: Never    Smokeless tobacco: Never   Substance Use Topics    Alcohol use: Never     OB History     Para Term  AB Living   2 1 0 1 0 0   SAB IAB Ectopic Multiple Live Births   0 0 0 0 1      # Outcome Date GA Lbr Jet/2nd Weight Sex Type Anes PTL Lv   2 Current            1  2022       Y ND      Obstetric Comments    - spontaneous  labor and  birth at ~7mo (pt does not know GA). Baby was in NICU (pt does not know for how long) before passing away.           LABS:   ===========  Prenatal Labs:  Rhogam not indicated   Lab Results   Component Value Date    AS Negative 2023    RUQIGG Positive 2023    HEPBANG Nonreactive 2023    HGB 9.3 (L) 2024    HIAGAB Nonreactive 2023    GLU1 88 2024     Rubella immune   GBS positive - urine  Other labs:  COVID-19 PCR Results           No data to display              COVID-19 Antibody Results, Testing for Immunity           No data to display               Results for orders placed or performed during the hospital encounter of 05/10/24 (from the past 24 hour(s))   Maternal Fetal US Comprehensive Single F/U    Narrative            Comp Follow Up  ---------------------------------------------------------------------------------------------------------  Pat. Name: MARK LE       Study Date:  05/10/2024 11:44am  Pat. NO:  [id number]        Referring  MD: HERNANDEZ GARCIA  Site:  [facility]       Sonographer: Ashwini Douglas RDMS  :  [id number]        Age:   23  ---------------------------------------------------------------------------------------------------------    INDICATION  ---------------------------------------------------------------------------------------------------------  Fetal Growth Restriction.  History of  delivery.  Limited prenatal care and history of infant death.      METHOD  ---------------------------------------------------------------------------------------------------------  Transabdominal ultrasound examination. View:  Sufficient      PREGNANCY  ---------------------------------------------------------------------------------------------------------  Heard pregnancy. Number of fetuses: 1      DATING  ---------------------------------------------------------------------------------------------------------                                           Date                                Details                                                                                      Gest. age                      JAYLEN  LMP                                                                         Cycle: LMP date uncertain  Prior assessment               10/27/2023                       GA: 10 w + 2 d                                                                           38 w + 2 d                     5/22/2024  U/S                                   5/10/2024                         based upon AC, BPD, Femur, HC                                                35 w + 3 d                     6/11/2024  Assigned dating                  Dating performed on 02/19/2024, based on the prior assessment (on 10/27/2023)                   38 w + 2 d                     5/22/2024      GENERAL EVALUATION  ---------------------------------------------------------------------------------------------------------  Cardiac activity present.  bpm.  Fetal movements present.  Presentation cephalic.  Placenta Posterior, No Previa, > 2 cm from internal os.  Umbilical cord 3 vessel cord.  Amniotic fluid Amount of AF: normal. MVP 4.4 cm.      FETAL BIOMETRY  ---------------------------------------------------------------------------------------------------------  Main Fetal Biometry:  BPD                                        88.9                    mm                         36w 0d                Hadlock  OFD                                        117.0                  mm                          -/-                Jovon  HC                                           327.3                  mm                          37w 1d                Hadlock  Cerebellum tr                            57.8                   mm                          -/-                Nicolaides  AC                                          318.8                  mm                          35w 6d        9%        Hadlock  Femur                                      63.4                   mm                          32w 5d                Hadlock  Humerus                                  54.7                    mm                         31w 6d                Oksana  Fetal Weight Calculation:  EFW                                       2,610                  g                                     6%         Hadlock  EFW (lb,oz)                             5 lb 12                 oz  EFW by                                        Hadlock (BPD--AC-FL)  Head / Face / Neck Biometry:                                             4.2                     mm  CM                                          6.0                     mm  Extremities / Bony Struc Biometry:  Radius                                     47.5                    mm                                 25%        Batres  Ulna                                        56.0                    mm                                 4%         Batres  Tibia                                        56.2                    mm                                 <1%        Batres  Fibula                                      57.0                    mm                                 9%        Batres      FETAL ANATOMY  ---------------------------------------------------------------------------------------------------------  The following structures appear normal:  Head / Neck                         Cranium. Head size. Head shape. Lateral ventricles. Midline falx. Cavum septi pellucidi. Cerebellum. Cisterna magna. Thalami.  Face                                    Lips. Profile. Nose.  Heart / Thorax                      4-chamber view. RVOT view. LVOT view. 3-vessel-trachea view.                                             Diaphragm.  Abdomen                             Stomach. Kidneys. Bladder.  Spine                                  Cervical spine. Thoracic spine. Lumbar spine. Sacral spine.    Gender: male.      FETAL DOPPLER  ---------------------------------------------------------------------------------------------------------  Umbilical Artery: normal. Sampling site: midcord  PI                                            0.80                                                          55%        Anabela  HR                                          131                     bpm      MATERNAL STRUCTURES  ---------------------------------------------------------------------------------------------------------  Cervix                                  Suboptimal  Right Ovary                          Not examined  Left Ovary                            Not examined      NON STRESS TEST  ---------------------------------------------------------------------------------------------------------  NST interpretation: reactive. Test duration 22 min. Baseline  bpm. Baseline variability: moderate. Accelerations: present. Decelerations: absent. Uterine activity:  absent. CTG category: normal/reassuring      RECOMMENDATION  ---------------------------------------------------------------------------------------------------------  We discussed the findings on today's ultrasound with the patient.    No further ultrasounds have been scheduled as delivery is planned tonight due to the persistent fetal growth restriction.    Return to primary provider for continued prenatal care.    Thank you for the opportunity to participate in the care of this patient. If you have questions regarding today's evaluation or if we can be of further service, please contact the  [facility].    **Fetal anomalies may be present but not detected**        Impression    IMPRESSION  ---------------------------------------------------------------------------------------------------------  1. Heard pregnancy at 38w 2d with fetal growth restriction.  2. None of the anomalies commonly detected by ultrasound were evident in the limited fetal anatomic survey as described above.  3. EFW was at the 6th%ile on today?s ultrasound. Appropriate interval growth was noted.  4. The amniotic fluid volume appeared normal.  5. The umbilical artery Dopplers were normal.  6. The fetal heart rate monitoring was appropriate for gestational age.           ROS  =========  Pt denies significant respiratory, cardiovacular, GI, or muscular/skeletalcomplaints.    See RN data base ROS.     PHYSICAL EXAM:  ===============  There were no vitals taken for this visit.  General appearance: comfortable  GENERAL APPEARANCE: healthy, alert and no distress  RESP: even, unlabored breathing.  CV: well perfused   ABDOMEN:  soft, nontender, no epigastric pain  SKIN: no suspicious lesions or rashes  NEURO: Denies headache, blurred vision, other vision changes  PSYCH: mentation appears normal. and affect normal/bright  Legs: No edema     Abdomen: gravid, vertex fetus per Leopold's, non-tender between contractions.   Cephalic presentation confirmed by BSUS.  EFW-  6 lbs.   CONTRACTIONS: none  FETAL HEART TONES: continuous EFM- baseline 140 with moderate variability and positive accelerations. No decelerations.  PELVIC EXAM: closed/ long/ Posterior/ average/ -3   LARSON SCORE: 1  BLOODY SHOW: no   ROM:no  FLUID: none  ROMPLUS: not done    ASSESSMENT:  ==============  IUP @ 38w2d admitted for induction of labor.  Indication: FGR   NST REACTIVE  Fetal Heart Tones - category one  GBS- positive- not treated until active labor  Patient Active Problem List   Diagnosis    Positive screening for depression on 9-item Patient Health Questionnaire  (PHQ-9)    High-risk pregnancy, unspecified trimester    Family history of unexplained  death    History of  delivery, currently pregnant    Inadequate material resources    Iron deficiency anemia secondary to inadequate dietary iron intake    Maternal varicella, non-immune    Low maternal weight gain, unspecified trimester    Fetal growth restriction antepartum- IOL 5/10 @ 1930-       PLAN:  ===========  Admit - see IP orders  pain medication options of nitrous oxide, fentanyl IV and epidural anesthesia reviewed with pt. Pt is interested in epidural.  cervical ripening with vaginal Misoprostol risks and benefits reviewed with pt. Agreeable to plan.  Prophylactic IV antibiotic for positive GBS status reviewed with pt. Agreeable to PCN when in active labor.   Ambulation, hydration, position changes, birthing ball and tub options to facilitate labor reviewed with pt .  Anticipate   Therapeutic sleep with IM morphine and PO vistaril reviewed with pt.  Orders placed. Patient will decide if she would like to utilize them.   FRANKI Mckinney CNM

## 2024-05-11 NOTE — PROVIDER NOTIFICATION
05/11/24 1453   Provider Notification   Provider Name/Title JACINTO García CNM   Method of Notification At Bedside   Request Evaluate in Person   Notification Reason SVE;Labor Status;Other (Comment)  (4-5/50%/-1)     Provider at bedside to reassess the patient. SVE done, same findings. CNM advised to start pitocin drip at low dose. Order acknowledged and carried out.

## 2024-05-11 NOTE — PROGRESS NOTES
"SUBJECTIVE:  ==============  General appearance: comfortable. Agreeable to SVE.  Support: partner    OBJECTIVE:  ==============  VITALS  Blood pressure 117/65, pulse 77, temperature 98.2  F (36.8  C), temperature source Oral, resp. rate 16, height 1.473 m (4' 9.99\"), weight 55.3 kg (122 lb), not currently breastfeeding.    FETAL HEART RATE ASSESSMENT:  Baseline rate 130, normal  Variability moderate  Accelerations present   Decelerations not present       CONTRACTIONS: Contractions every 2-4 minutes.  Palpate: mild  Pitocin- none,  Antibiotics- none    ROM: not ruptured  PELVIC EXAM: 3/50/-2, mid, soft; louis 6    Assessment: EFM interpretation suggests absence of concern for fetal metabolic acidemia at this time due to accelerations present and variability: moderate    Labor course:    SVE closed/long/high, louis 1  2048 PV miso #1  0030 250mL fluid bolus, ctx q 2min  0330 vistaril, ctx 1-3 and more uncomfortable   0730 3/50/-2; louis 6    ASSESSMENT:  ==============  Kaelyn MILAD Reed  23 year old  female  Estimated Date of Delivery: May 22, 2024  IUP @ 38w3d IOL FGR   Fetal Heart Rate Tracing category one over the last 60 minutes  GBS- positive- not treated until active labor    Patient Active Problem List   Diagnosis    Positive screening for depression on 9-item Patient Health Questionnaire (PHQ-9)    High-risk pregnancy, unspecified trimester    Family history of unexplained  death    History of  delivery, currently pregnant    Inadequate material resources    Iron deficiency anemia secondary to inadequate dietary iron intake    Maternal varicella, non-immune    Low maternal weight gain, unspecified trimester    Fetal growth restriction antepartum- IOL 5/10 @ 1930-    Encounter for induction of labor     PLAN:  ===========  - Ambulation, hydration, position changes, birthing ball/sling and tub options to facilitate labor.  - Reviewed cervical change with contractions, options " for expectant management vs. attempt at barney bulb placement.  Discussed possibility that barney bulb is displaced quickly d/t dilation and softening of cervix.  Pt agreeable to expectant management at this time.  - Reevaluate in 4 hours, earlier as clinically indicated    FRANKI HernandezM

## 2024-05-11 NOTE — PLAN OF CARE
Data: Patient admitted to room 479 at 1921 for their scheduled IOL for FGR. Patient is a . Prenatal record reviewed.   OB History    Para Term  AB Living   2 1 0 1 0 0   SAB IAB Ectopic Multiple Live Births   0 0 0 0 1      # Outcome Date GA Lbr Jet/2nd Weight Sex Type Anes PTL Lv   2 Current            1  2022       Y ND      Obstetric Comments    - spontaneous  labor and  birth at ~7mo (pt does not know GA). Baby was in NICU (pt does not know for how long) before passing away.        In Person  Royal is bedside for translation.    Medical History: No past medical history on file..  Gestational age 38w2d. Vital signs per doc flowsheet. Fetal movement present. Patient reports Induction Of Labor   as reason for admission. Support person Woody present.  Action: Verbal consent for EFM, external fetal monitors applied. Admission assessment completed. Patient and support persons educated on labor process. Patient instructed to report change in fetal movement, contractions, vaginal leaking of fluid or bleeding, abdominal pain, or any concerns related to the pregnancy to her nurse/physician. Patient oriented to room, call light in reach.   Response: Caleb CNM notified of patient arrival for scheduled IOL. Patient verbalized understanding of education and verbalized agreement with plan. Reviewed pain medication/management with pt and her partner, prior delivery was without medication. 18 g IV to left forearm, tolerated well, labs drawn and sent to lab. Water provided. Updated patient will await CNM to come for eval and discuss IOL plan. Pt verbalized understanding.

## 2024-05-11 NOTE — PROGRESS NOTES
"SUBJECTIVE:  ==============  General appearance: feeling more uncomfortable with contractions, reports feeling intermittent pressure  Support: partner, not currently present    OBJECTIVE:  ==============  VITALS  Blood pressure 99/54, pulse 77, temperature 98.6  F (37  C), temperature source Oral, resp. rate 16, height 1.473 m (4' 9.99\"), weight 55.3 kg (122 lb), not currently breastfeeding.    FETAL HEART RATE ASSESSMENT:  Baseline rate 140, normal  Variability moderate  Accelerations present   Decelerations not present    CONTRACTIONS: Contractions every 2-3 minutes.  Palpate: moderate  Pitocin- none,  Antibiotics- none    ROM: not ruptured  PELVIC EXAM:     ASSESSMENT:  ==============  Kaelyn MILAD Reed  23 year old  female  Estimated Date of Delivery: May 22, 2024  IUP @ 38w3d IOL FGR   Fetal Heart Rate Tracing category one over the last 60 minutes  GBS- positive, abx to be started    Patient Active Problem List   Diagnosis    Positive screening for depression on 9-item Patient Health Questionnaire (PHQ-9)    High-risk pregnancy, unspecified trimester    Family history of unexplained  death    History of  delivery, currently pregnant    Inadequate material resources    Iron deficiency anemia secondary to inadequate dietary iron intake    Maternal varicella, non-immune    Low maternal weight gain, unspecified trimester    Fetal growth restriction antepartum- IOL 5/10 @ 1930-    Encounter for induction of labor      PLAN:  ===========  - Ambulation, hydration, position changes, birthing ball/sling and tub options to facilitate labor.  - Pain medication- pt declines at this time.  - Discussed possibility of labor augmentation with Pitocin and/or AROM. Will re-evaluate with next exam.  - Prophylactic IV antibiotic for positive GBS status reviewed with pt. Agreeable to PCN.   - Reevaluate as clinically indicated    FRANKI Hernandez CNM    "

## 2024-05-11 NOTE — PLAN OF CARE
Labor Shift Note  Data: Contraction irregular, palpate strong. Fetal assessment category one.   Vitals:    05/11/24 0322 05/11/24 0756 05/11/24 1212 05/11/24 1617   BP: 117/65 99/54 103/60 104/53   BP Location:  Right arm     Patient Position:  Right side     Cuff Size:  Adult Small     Pulse:       Resp: 16 16 16    Temp: 98.2  F (36.8  C) 98.6  F (37  C) 98.6  F (37  C) 98  F (36.7  C)   TempSrc: Oral Oral Oral    Weight:       Height:       . No intake/output data recorded..  Intact membrane. Signs and symptoms of infection absent.  Blood pressures WNL. Signs and symptoms of pre-eclampsia: absent,  Support person Woody present.  Interventions: Continue uterine/fetal assessment continuously. Vital Signs per order set. Comfort measures birthing ball.  Medicated for none.  Plan: Provide labor/coping assistance as needed by patient and support person.  Observe for and notify care provider of indications of progressing labor, need for pain medications,  or signs of fetal/maternal compromise.

## 2024-05-11 NOTE — PROVIDER NOTIFICATION
05/11/24 1058   Provider Notification   Provider Name/Title JACINTO García CNM   Method of Notification Electronic Page   Request Evaluate in Person   Notification Reason Status Update;Pain  (patient states feeling of pressure and urge to push.)     Provider updated about patient's status. Patient verbalized feeling of pressure and urge to push. Provider acknowledged and will come to assess patient.

## 2024-05-11 NOTE — PROGRESS NOTES
"Labor Progress Note    SUBJECTIVE:    Kaelyn Reed  Estimated Date of Delivery: May 22, 2024  Patient is coping well with contractions. Sleeping.   General appearance: comfortable        OBJECTIVE:  VITALS  Blood pressure 93/51, pulse 77, temperature 98.2  F (36.8  C), temperature source Oral, resp. rate 16, height 1.473 m (4' 9.99\"), weight 55.3 kg (122 lb), not currently breastfeeding.  Patient Vitals for the past 24 hrs:   BP Temp Temp src Pulse Resp Height Weight   05/10/24 2322 93/51 98.2  F (36.8  C) Oral -- 16 -- --   05/10/24 1928 110/63 98.3  F (36.8  C) Oral 77 15 1.473 m (4' 9.99\") 55.3 kg (122 lb)       FETAL HEART RATE ASSESSMENT:  Baseline rate 135, normal  Variability moderate  Accelerations present   Decelerations not present      EFM interpretation suggests absence of concern for fetal metabolic acidemia at this time due to accelerations present and decelerations absent  The interventions currently taken to improve the fetal heart rate tracing and fetal oxygenation are:   CONTRACTIONS: Contractions every 2 minutes.      Pelvic exam: deferred.   Membrane status: not ruptured      Pitocin- none,  Antibiotics- PCN    ASSESSMENT:    Kaelyn Reed  23 year old  female  Estimated Date of Delivery: May 22, 2024  IUP @ 38w3d IOL    Patient Active Problem List   Diagnosis    Positive screening for depression on 9-item Patient Health Questionnaire (PHQ-9)    High-risk pregnancy, unspecified trimester    Family history of unexplained  death    History of  delivery, currently pregnant    Inadequate material resources    Iron deficiency anemia secondary to inadequate dietary iron intake    Maternal varicella, non-immune    Low maternal weight gain, unspecified trimester    Fetal growth restriction antepartum- IOL 5/10 @ 1930-    Encounter for induction of labor       Fetal Heart Rate Tracing category one over the last 60 minutes  GBS- positive- not treated until active " labor    PLAN:  ==========  -Continue frequent position changes  -Continue labor support  - 250mL fluid bolus. Hold misoprostol until contractions space.     FRANKI Mckinney CNM

## 2024-05-11 NOTE — PROVIDER NOTIFICATION
05/11/24 0740   Provider Notification   Provider Name/Title JACINTO García CNM   Method of Notification At Bedside   Request Evaluate in Person   Notification Reason Labor Status;SVE     CNM at bedside to reassess patient. SVE done 3/40-50%/-2. Plan to re-assess patient at 1130 and will decide for the next step to do to help improve labor progress. Everything explained with interpreters help, patient agreed with the plan.

## 2024-05-11 NOTE — PLAN OF CARE
Received report from YADY Cardenas at 2200. Pt was resting on bed, calm and cooperative. VSS on RA. C/o pain on lower abdomen but tolerable. Declined pain meds. At 0027 and  0333, 250 LR boluses to improve contraction frequencies, CNM aware. No Miso was given this shift. FHT remains at cat 1. At 0330, pt c/o feeling more discomfort from contractions, and lower back pain. Hot packs applied on lower back. Pt is able to get some rest after Atarax. Continue plan of care.  Report given to YADY Oh.

## 2024-05-12 PROCEDURE — 722N000001 HC LABOR CARE VAGINAL DELIVERY SINGLE

## 2024-05-12 PROCEDURE — 59410 OBSTETRICAL CARE: CPT | Performed by: REGISTERED NURSE

## 2024-05-12 PROCEDURE — 120N000002 HC R&B MED SURG/OB UMMC

## 2024-05-12 PROCEDURE — 258N000003 HC RX IP 258 OP 636: Performed by: REGISTERED NURSE

## 2024-05-12 PROCEDURE — 250N000011 HC RX IP 250 OP 636: Performed by: REGISTERED NURSE

## 2024-05-12 PROCEDURE — 250N000013 HC RX MED GY IP 250 OP 250 PS 637: Performed by: REGISTERED NURSE

## 2024-05-12 PROCEDURE — 10907ZC DRAINAGE OF AMNIOTIC FLUID, THERAPEUTIC FROM PRODUCTS OF CONCEPTION, VIA NATURAL OR ARTIFICIAL OPENING: ICD-10-PCS | Performed by: REGISTERED NURSE

## 2024-05-12 RX ORDER — OXYTOCIN 10 [USP'U]/ML
10 INJECTION, SOLUTION INTRAMUSCULAR; INTRAVENOUS
Status: DISCONTINUED | OUTPATIENT
Start: 2024-05-12 | End: 2024-05-13 | Stop reason: HOSPADM

## 2024-05-12 RX ORDER — MODIFIED LANOLIN
OINTMENT (GRAM) TOPICAL
Status: DISCONTINUED | OUTPATIENT
Start: 2024-05-12 | End: 2024-05-13 | Stop reason: HOSPADM

## 2024-05-12 RX ORDER — MISOPROSTOL 200 UG/1
800 TABLET ORAL
Status: DISCONTINUED | OUTPATIENT
Start: 2024-05-12 | End: 2024-05-13 | Stop reason: HOSPADM

## 2024-05-12 RX ORDER — CARBOPROST TROMETHAMINE 250 UG/ML
250 INJECTION, SOLUTION INTRAMUSCULAR
Status: DISCONTINUED | OUTPATIENT
Start: 2024-05-12 | End: 2024-05-13 | Stop reason: HOSPADM

## 2024-05-12 RX ORDER — MISOPROSTOL 200 UG/1
400 TABLET ORAL
Status: DISCONTINUED | OUTPATIENT
Start: 2024-05-12 | End: 2024-05-13 | Stop reason: HOSPADM

## 2024-05-12 RX ORDER — METHYLERGONOVINE MALEATE 0.2 MG/ML
200 INJECTION INTRAVENOUS
Status: DISCONTINUED | OUTPATIENT
Start: 2024-05-12 | End: 2024-05-13 | Stop reason: HOSPADM

## 2024-05-12 RX ORDER — DOCUSATE SODIUM 100 MG/1
100 CAPSULE, LIQUID FILLED ORAL DAILY
Status: DISCONTINUED | OUTPATIENT
Start: 2024-05-12 | End: 2024-05-13 | Stop reason: HOSPADM

## 2024-05-12 RX ORDER — BISACODYL 10 MG
10 SUPPOSITORY, RECTAL RECTAL DAILY PRN
Status: DISCONTINUED | OUTPATIENT
Start: 2024-05-12 | End: 2024-05-13 | Stop reason: HOSPADM

## 2024-05-12 RX ORDER — IBUPROFEN 800 MG/1
800 TABLET, FILM COATED ORAL EVERY 6 HOURS PRN
Status: DISCONTINUED | OUTPATIENT
Start: 2024-05-12 | End: 2024-05-13 | Stop reason: HOSPADM

## 2024-05-12 RX ORDER — ACETAMINOPHEN 325 MG/1
650 TABLET ORAL EVERY 4 HOURS PRN
Status: DISCONTINUED | OUTPATIENT
Start: 2024-05-12 | End: 2024-05-13 | Stop reason: HOSPADM

## 2024-05-12 RX ORDER — LOPERAMIDE HCL 2 MG
2 CAPSULE ORAL
Status: DISCONTINUED | OUTPATIENT
Start: 2024-05-12 | End: 2024-05-13 | Stop reason: HOSPADM

## 2024-05-12 RX ORDER — LOPERAMIDE HCL 2 MG
4 CAPSULE ORAL
Status: DISCONTINUED | OUTPATIENT
Start: 2024-05-12 | End: 2024-05-13 | Stop reason: HOSPADM

## 2024-05-12 RX ORDER — HYDROCORTISONE 25 MG/G
CREAM TOPICAL 3 TIMES DAILY PRN
Status: DISCONTINUED | OUTPATIENT
Start: 2024-05-12 | End: 2024-05-13 | Stop reason: HOSPADM

## 2024-05-12 RX ORDER — OXYTOCIN/0.9 % SODIUM CHLORIDE 30/500 ML
340 PLASTIC BAG, INJECTION (ML) INTRAVENOUS CONTINUOUS PRN
Status: DISCONTINUED | OUTPATIENT
Start: 2024-05-12 | End: 2024-05-13 | Stop reason: HOSPADM

## 2024-05-12 RX ORDER — TRANEXAMIC ACID 10 MG/ML
1 INJECTION, SOLUTION INTRAVENOUS EVERY 30 MIN PRN
Status: DISCONTINUED | OUTPATIENT
Start: 2024-05-12 | End: 2024-05-13 | Stop reason: HOSPADM

## 2024-05-12 RX ADMIN — KETOROLAC TROMETHAMINE 30 MG: 30 INJECTION, SOLUTION INTRAMUSCULAR at 04:32

## 2024-05-12 RX ADMIN — DOCUSATE SODIUM 100 MG: 100 CAPSULE, LIQUID FILLED ORAL at 10:09

## 2024-05-12 RX ADMIN — PENICILLIN G 3 MILLION UNITS: 3000000 INJECTION, SOLUTION INTRAVENOUS at 00:37

## 2024-05-12 RX ADMIN — SODIUM CHLORIDE, POTASSIUM CHLORIDE, SODIUM LACTATE AND CALCIUM CHLORIDE 250 ML: 600; 310; 30; 20 INJECTION, SOLUTION INTRAVENOUS at 00:21

## 2024-05-12 ASSESSMENT — ACTIVITIES OF DAILY LIVING (ADL)
ADLS_ACUITY_SCORE: 18

## 2024-05-12 NOTE — PROGRESS NOTES
"Labor Progress Note    SUBJECTIVE:    Kaelyn Reed  Estimated Date of Delivery: May 22, 2024  Patient is coping well with contractions. Sleeping per RN. Comfortable with epidural.         OBJECTIVE:  VITALS  Blood pressure 111/64, pulse 77, temperature 98.4  F (36.9  C), temperature source Oral, resp. rate 16, height 1.473 m (4' 9.99\"), weight 55.3 kg (122 lb), SpO2 97%, not currently breastfeeding.  Patient Vitals for the past 24 hrs:   BP Temp Temp src Resp SpO2   24 111/64 -- -- -- 97 %   24 109/60 -- -- -- 98 %   24 116/61 -- -- 16 98 %   24 126/65 -- -- 16 98 %   24 116/67 -- -- 18 99 %   24 117/55 -- -- 18 99 %   24 119/58 -- -- 18 99 %   24 122/59 -- -- 16 100 %   24 110/58 -- -- 18 99 %   24 111/55 -- -- 18 --   24 112/59 -- -- 18 --   24 114/59 -- -- 18 --   24 115/64 -- -- 18 --   24 110/57 -- -- 18 100 %   24 108/58 -- -- 16 --   24 109/56 -- -- 18 --   24 107/65 -- -- 16 --   24 121/68 -- -- 22 --   24 119/59 98.4  F (36.9  C) Oral 16 --   24 1617 104/53 98  F (36.7  C) -- -- --   24 103/60 98.6  F (37  C) Oral 16 --   24 0756 99/54 98.6  F (37  C) Oral 16 --   24 0322 117/65 98.2  F (36.8  C) Oral 16 --   05/10/24 2322 93/51 98.2  F (36.8  C) Oral 16 --       FETAL HEART RATE ASSESSMENT:  Baseline rate 140, normal  Variability moderate  Accelerations present   Decelerations not present      EFM interpretation suggests absence of concern for fetal metabolic acidemia at this time due to accelerations present and decelerations absent  The interventions currently taken to improve the fetal heart rate tracing and fetal oxygenation are:     CONTRACTIONS: Contractions every 2-3 minutes.     Pelvic exam: deferred.   Membrane status: not ruptured      Pitocin- 6 " mu/min.,  Antibiotics- PCN  ASSESSMENT:    Kaelyn Reed  23 year old  female  Estimated Date of Delivery: May 22, 2024  IUP @ 38w3d IOL    Patient Active Problem List   Diagnosis    Positive screening for depression on 9-item Patient Health Questionnaire (PHQ-9)    High-risk pregnancy, unspecified trimester    Family history of unexplained  death    History of  delivery, currently pregnant    Inadequate material resources    Iron deficiency anemia secondary to inadequate dietary iron intake    Maternal varicella, non-immune    Low maternal weight gain, unspecified trimester    Fetal growth restriction antepartum- IOL 5/10 @ 1930-    Encounter for induction of labor       Fetal Heart Rate Tracing category one over the last 60 minutes      GBS- positive- adequately treated    PLAN:  ==========  - Comfortable with epidural.   -Continue frequent position changes  -Continue labor support      FRANKI MckinneyM

## 2024-05-12 NOTE — PLAN OF CARE
VSS and postpartum assessments WDL.  Up ad mario with steady gait and independent with cares.  Bonding well with infant.  Breastfeeding on cue independently, encouraged increased oral intake.  Pain managed without medication at this time, ice and medication offered and patient declined.  Partner and family present and supportive this afternoon.  Will continue with postpartum cares and education per plan of care.

## 2024-05-12 NOTE — PROGRESS NOTES
"Labor Progress Note    SUBJECTIVE:    Kaelyn Reed  Estimated Date of Delivery: May 22, 2024  Patient is coping well with contractions.   General appearance: comfortable        OBJECTIVE:  VITALS  Blood pressure 104/53, pulse 77, temperature 98  F (36.7  C), resp. rate 16, height 1.473 m (4' 9.99\"), weight 55.3 kg (122 lb), not currently breastfeeding.  Patient Vitals for the past 24 hrs:   BP Temp Temp src Pulse Resp Height Weight   24 1617 104/53 98  F (36.7  C) -- -- -- -- --   24 1212 103/60 98.6  F (37  C) Oral -- 16 -- --   24 0756 99/54 98.6  F (37  C) Oral -- 16 -- --   24 0322 117/65 98.2  F (36.8  C) Oral -- 16 -- --   05/10/24 2322 93/51 98.2  F (36.8  C) Oral -- 16 -- --   05/10/24 1928 110/63 98.3  F (36.8  C) Oral 77 15 1.473 m (4' 9.99\") 55.3 kg (122 lb)       FETAL HEART RATE ASSESSMENT:  Baseline rate 140, normal  Variability moderate  Accelerations present   Decelerations not present     EFM interpretation suggests absence of concern for fetal metabolic acidemia at this time due to accelerations present and decelerations absent  The interventions currently taken to improve the fetal heart rate tracing and fetal oxygenation are:     CONTRACTIONS: Contractions every 2-3 minutes.     Pelvic exam: deferred.   Membrane status: not ruptured      Pitocin- 4 mu/min.,  Antibiotics- PCN    ASSESSMENT:    Kaelyn Reed  23 year old  female  Estimated Date of Delivery: May 22, 2024  IUP @ 38w3d IOL    Patient Active Problem List   Diagnosis    Positive screening for depression on 9-item Patient Health Questionnaire (PHQ-9)    High-risk pregnancy, unspecified trimester    Family history of unexplained  death    History of  delivery, currently pregnant    Inadequate material resources    Iron deficiency anemia secondary to inadequate dietary iron intake    Maternal varicella, non-immune    Low maternal weight gain, unspecified trimester    Fetal " growth restriction antepartum- IOL 5/10 @ 1930-    Encounter for induction of labor       Fetal Heart Rate Tracing category one over the last 60 minutes    GBS- positive- adequately treated    PLAN:  ==========  -Continue frequent position changes  -Continue labor support  - Up walking the halls.   - Continue titrate pitocin per protocol.    FRANKI MckinneyM

## 2024-05-12 NOTE — ANESTHESIA PROCEDURE NOTES
"Epidural catheter Procedure Note    Pre-Procedure   Staff -        Anesthesiologist:  Ean Donnelly MD       Resident/Fellow: Suresh Almazan MD       Performed By: resident and with residents       Procedure performed by resident/fellow/CRNA in presence of a teaching physician.         Location: OB       Procedure Start/Stop Times: 5/11/2024 9:00 PM and 5/11/2024 9:15 PM       Pre-Anesthestic Checklist: patient identified, IV checked, risks and benefits discussed, informed consent, monitors and equipment checked, pre-op evaluation, at physician/surgeon's request and post-op pain management  Timeout:       Correct Patient: Yes        Correct Procedure: Yes        Correct Site: Yes        Correct Position: Yes   Procedure Documentation  Procedure: epidural catheter       Patient Position: sitting       Skin prep: Chloraprep       Local skin infiltrated with mL of 1% lidocaine.        Insertion Site: L3-4. (midline approach).       Technique: LORT saline        CHARLIE at 4.5 cm.       Needle Type: [facility]       Needle Gauge: 17.        Needle Length (Inches): 3.5        Catheter: 19 G.          Catheter threaded easily.         5 cm epidural space.         Threaded 10 cm at skin.         # of attempts: 1 and  # of redirects:  0    Assessment/Narrative         Paresthesias: No.       Test dose of 3 mL lidocaine 1.5% w/ 1:200,000 epinephrine at.         Test dose negative, 3 minutes after injection, for signs of intravascular, subdural, or intrathecal injection.       Insertion/Infusion Method: LORT saline       Aspiration negative for Heme or CSF via Epidural Catheter.       Sensory Level Left: T10.       Sensory Level Right: T10.    Medication(s) Administered   Medication Administration Time: 5/11/2024 9:00 PM     Comments:  Clinician bolus of 5 mL given through CADD pump      FOR [facility] ([facility] ONLY:   Pain Team Contact information: please page the Pain Team Via [employer]. Search \"Pain\". During daytime " hours, please page the attending first. At night please page the resident first.

## 2024-05-12 NOTE — SUMMARY OF CARE
Patient arrived to [facility] unit via wheelchair at 0545 ,with belongings, accompanied by spouse/ significant other, with infant in arms. Received report from  YADY Crowder  and checked bands. Unit and room orientation completd. Call light given; no concerns present at this time. Continue with plan of care.

## 2024-05-12 NOTE — PROVIDER NOTIFICATION
05/12/24 0133   Provider Notification   Provider Name/Title Jill Ellis CNM   Method of Notification Electronic Page   Request Evaluate in Person   Notification Reason Variability Change     Requested CNM to bedside for cervical exam due to minimal variability with occasional decels (see flowsheet) despite repositioning and LR bolus.     RESPONSE: CNM came to bedside, found pt to be complete with baby at zero station.

## 2024-05-12 NOTE — PLAN OF CARE
[id number]: VSS and postpartum assessments WDL.  Up ad mario with steady gait and independent with cares.  Bonding well with infant.  Breastfeeding on cue with assist.  Pt declines pain and declines pain medication.  Will continue with postpartum cares and education per plan of care.

## 2024-05-12 NOTE — PLAN OF CARE
of baby boy at 0310. Coaching used to help patient with pushing efforts. VSS. No tearing to perineum but significant swelling. Fundus firm, midline, 1 cm below see flowsheet. Per midwife, keep careful watch over bleeding. While placenta was complete, there were some trailing membranes and it is believed that all were removed, however extra care should be taken with fundals and monitoring blood loss.

## 2024-05-12 NOTE — PLAN OF CARE
Data: Kaelyn Reed transferred to [id number] via wheelchair at 0535. Baby transferred via parent's arms.  Action: Receiving unit notified of transfer: Yes. Patient and family notified of room change. Report given to Mandie CONTEH at 0415 with follow up bedside report at 0540. Belongings sent to receiving unit. Accompanied by Registered Nurse. Oriented patient to surroundings. Call light within reach. ID bands double-checked with receiving RN.  Response: Patient tolerated transfer and is stable.

## 2024-05-12 NOTE — PROGRESS NOTES
"Labor Progress Note    SUBJECTIVE:    Kaelyn Reed  Estimated Date of Delivery: May 22, 2024  Patient is coping well with contractions.   General appearance: comfortable        OBJECTIVE:  VITALS  Blood pressure 123/67, pulse 77, temperature 98.2  F (36.8  C), temperature source Oral, resp. rate 16, height 1.473 m (4' 9.99\"), weight 55.3 kg (122 lb), SpO2 100%, not currently breastfeeding.  Patient Vitals for the past 24 hrs:   BP Temp Temp src Resp SpO2   24 0110 123/67 98.2  F (36.8  C) Oral 16 100 %   24 0000 102/56 -- -- 16 99 %   24 2330 114/62 98.2  F (36.8  C) Oral 18 100 %   240 105/58 -- -- -- 98 %   24 111/64 -- -- -- 97 %   24 109/60 -- -- -- 98 %   24 116/61 -- -- 16 98 %   24 126/65 -- -- 16 98 %   24 116/67 -- -- 18 99 %   24 117/55 -- -- 18 99 %   24 119/58 -- -- 18 99 %   24 122/59 -- -- 16 100 %   24 110/58 -- -- 18 99 %   24 111/55 -- -- 18 --   24 112/59 -- -- 18 --   24 114/59 -- -- 18 --   24 115/64 -- -- 18 --   24 110/57 -- -- 18 100 %   24 108/58 -- -- 16 --   24 109/56 -- -- 18 --   24 107/65 -- -- 16 --   24 121/68 -- -- 22 --   24 119/59 98.4  F (36.9  C) Oral 16 --   24 1617 104/53 98  F (36.7  C) -- -- --   24 1212 103/60 98.6  F (37  C) Oral 16 --   24 0756 99/54 98.6  F (37  C) Oral 16 --   24 0322 117/65 98.2  F (36.8  C) Oral 16 --       FETAL HEART RATE ASSESSMENT:  Baseline rate 140, normal  Variability moderate with periods of minimal.   Accelerations present   Decelerations present, deceleration type: early, deceleration frequency: recurrent (occurring 50% or more).   Are the decelerations significant?   No   variable, deceleration frequency: intermittent. Are the decelerations significant?   No    EFM " interpretation suggests absence of concern for fetal metabolic acidemia at this time due to accelerations present and heart rate: normal baseline  The interventions currently taken to improve the fetal heart rate tracing and fetal oxygenation are: maternal positioning, IV fluid bolus , discontinue oxytocin , and emotional support  CONTRACTIONS: Contractions every 2-3.5 minutes.  Palpate: strong    Pelvic exam: 10/ 100%/ Anterior/ soft/ 0    Membrane status: clear fluid      Pitocin- discontinued,  Antibiotics- PCN    ASSESSMENT:    Kaelyn Reed  23 year old  female  Estimated Date of Delivery: May 22, 2024  IUP @ 38w4d IOL    Patient Active Problem List   Diagnosis    Positive screening for depression on 9-item Patient Health Questionnaire (PHQ-9)    High-risk pregnancy, unspecified trimester    Family history of unexplained  death    History of  delivery, currently pregnant    Inadequate material resources    Iron deficiency anemia secondary to inadequate dietary iron intake    Maternal varicella, non-immune    Low maternal weight gain, unspecified trimester    Fetal growth restriction antepartum- IOL 5/10 @ 1930-    Encounter for induction of labor       Fetal Heart Rate Tracing category two over the last 45 minutes  Per the category II algorithm, the plan is to continue observe/conservative management. Reevaluate in 30 minutes.     GBS- positive- adequately treated    PLAN:  ==========  - Category 2 huddle with RN: Will empty bladder and commence pushing. Pitocin stopped.    -Continue labor support    FRANKI Mckinney CNM      An  was present (in-person/by phone/virtually) throughout the visit today. After discussing my assessment and treatment recommendations including risks and benefits with the patient/ patient s family, I asked the patient / family to convey what they understood about my assessment and recommendations to ensure understanding. The communication  back from the patient/ family /parent, as relayed through the , confirmed understanding. The patient /family was also given time to have all questions answered.

## 2024-05-12 NOTE — PLAN OF CARE
Vital signs within normal limits. Postpartum checks within normal limits - see flow record. Patient is due to void. Patient is breastfeeding.   Patient denies pain,  stated she was comfortable. Positive attachment behaviors observed with infant.  Significant other present.     Problem: Adult Inpatient Plan of Care  Goal: Optimal Comfort and Wellbeing  Outcome: Progressing  Intervention: Provide Person-Centered Care  Recent Flowsheet Documentation  Taken 5/12/2024 0545 by Mandie Forrest RN  Trust Relationship/Rapport:   care explained   choices provided     Problem: Postpartum (Vaginal Delivery)  Goal: Effective Urinary Elimination  Outcome: Progressing

## 2024-05-12 NOTE — L&D DELIVERY NOTE
Delivery Summary    Kaelyn Reed MRN# [id number]   Age: 23 year old YOB: 2000     Delivery Note    Labor Course:   Patient presented to labor and delivery 5/10/24 for scheduled induction of labor for fetal growth restriction. On admission SVE closed/long/high, louis 1. PV misoprostol was started. First dose 5/10/24 @ 2048. At 0330 (24) contractions were every 1-3 minutes and more uncomfortable for patient and she elected PO vistaril to help with sleep. At 0730 SVE 3/50/-2; louis 6. At 1100 SVE 4/50/-1. PCN was started at 1127 for GBS prophylaxis. At 1519 pitocin was started to continue induction of labor. She walked the halls and used the birthing ball until  when she received an epidural for pain management. At 2331 SVE 6/80/0, pitocin at 4mu. Amniotomy was performed with patient consent. Scant amount of clear fluid noted. At 0133 (2024) CNM paged to bedside for Category II FHR tracing. SVE revealed complete dilation, effacement and 0 station. Pitocin shut off at 0148 due to recurrent variable decelerations. Pushing started. Pitocin resumed at 0205 with improved FHR tracing.     Delivery Course:  Pt became complete at 0142 and started pushing 0142. Delivered a vigorous baby male at 0310 who was immediately placed on mom's abdomen. No nuchal. IV pitocin started after delivery of infant per protocol. Umbilical cord was double clamped and cut by MEJIA Mckay after the cord stopped pulsing. Cord blood obtained. Placenta spontaneously delivered intact at 0314 without difficulty via Schultze mechanism. Inspection of vagina and perineum revealed intact perineum. Fundus is firm and midline.  Mom and baby are stable.      IUP at 38+4 weeks gestation delivered on May 12, 2024.     delivery of a viable Male infant.  Weight : pending  Apgars of 9 at 1 minute and 9 at 5 minutes.  Labor was induced.  Medications administered  in labor:  Pain Rx theraputic sleep  and Epidural; Antibiotics  Yes: PCN for GBS prophylaxis  Perineum: Intact  Placenta-mechanism: spontaneous, intact,  with a 3 vessel cord. IV oxytocin was given.  QBL was 200.  Anticipated Discharge Date: 2024  Complications of pregnancy, labor and delivery: FGR, Limited PNC, Anemia, Varicella non-immune, hx of  delivery, low maternal weight gain, GBS positive- treated.   Birth attendants:FRANKI Mckinney CNM, Male-Rosa [[id number]]      Labor Event Times      Latent labor onset date/time: 2024 1100    Active labor onset date: 24 Onset time:  9:12 PM   Dilation complete date: 24 Complete time:  1:42 AM   Start pushing date/time: 2024 0142          Labor Events     labor?: No  Labor Type: Induction/Cervical ripening  Predominate monitoring during 1st stage: continuous electronic fetal monitoring     Antibiotics received during labor?: Yes  Reason for Antibiotics: GBS  Antibiotics received for GBS: Penicillin  Antibiotics Given (GBS): Greater than 4 hours prior to delivery       Rupture date/time: 24 2323   Rupture type: Artificial Rupture of Membranes  Fluid color: Clear  Fluid odor: Normal     Induction: Misoprostol, AROM  Induction date/time: 5/10/24 1930    Cervical ripening date/time: 5/10/24 2048    Indications for induction: Fetal Indications or Congenital Malformations (Comment to specify)       Delivery/Placenta Date and Time      Delivery Date: 24 Delivery Time:  3:10 AM   Oxytocin given at the time of delivery: after delivery of baby  Delivering clinician: Jill Ellis APRN CNM              Vaginal Counts       Initial count performed by 2 team members:  Two Team Members   ANTONIA dumas RN         Needles Suture Needles Sponges (RETIRED) Instruments   Initial counts 2 0 5    Added to count       Relief counts       Final counts 2 0 5            Placed during labor Accounted for at the end of labor   FSE NA NA   IUPC NA NA    Cervidil NA NA                      Final count correct?: Yes  Pre-Birth Team Brief: Complete  Post-Birth Team Debrief: Complete       Apgars    Living status: Living   1 Minute 5 Minute 10 Minute 15 Minute 20 Minute   Skin color:        Heart rate:        Reflex irritability:        Muscle tone:        Respiratory effort:        Total:               Cord      Cord Complications: None               Stem cell collection?: No           Labor Events and Shoulder Dystocia    Fetal Tracing Prior to Delivery: Category 2  Fetal Tracing Comments: variable decelerations with pushing contractions.  Shoulder dystocia present?: Neg       Delivery (Maternal) (Provider to Complete) ([id number])    Episiotomy: None  Perineal lacerations: None    Repair suture: None  Genital tract inspection done: Pos       Blood Loss  Mother: Kaelyn Schaefer #[id number]     Start of Mother's Information      Delivery Blood Loss  05/11/24 2112 - 05/12/24 0343      Delivery QBL (mL) Hospital Encounter 223 mL    Total  223 mL               End of Mother's Information  Mother: Kaelyn Schaefer #[id number]                Delivery - Provider to Complete ([id number])    Delivering clinician: Jill Ellis APRN CNM  Delivery Type (Choose the 1 that will go to the Birth History): Vaginal, Spontaneous                                           Placenta    Removal: Spontaneous  Disposition: Hospital disposal             Anesthesia    Method: Epidural  Cervical dilation at placement: 4-7                    Presentation and Position    Presentation: Vertex     Occiput Anterior                     FRANKI Mckinney CNM

## 2024-05-12 NOTE — PROVIDER NOTIFICATION
05/12/24 0017   Provider Notification   Provider Name/Title Caleb KNIGHT   Method of Notification Electronic Page   Request Evaluate - Remote   Notification Reason Variability Change     Fetal tracing mostly minimal since AROM. Requested bolus.    Response: Verbal order for 250 mL bolus, to be repeated if ineffective.

## 2024-05-13 ENCOUNTER — OFFICE VISIT (OUTPATIENT)
Dept: INTERPRETER SERVICES | Facility: CLINIC | Age: 24
End: 2024-05-13
Payer: MEDICAID

## 2024-05-13 VITALS
TEMPERATURE: 97.5 F | HEIGHT: 58 IN | DIASTOLIC BLOOD PRESSURE: 58 MMHG | RESPIRATION RATE: 16 BRPM | BODY MASS INDEX: 24.11 KG/M2 | OXYGEN SATURATION: 98 % | WEIGHT: 114.86 LBS | HEART RATE: 73 BPM | SYSTOLIC BLOOD PRESSURE: 109 MMHG

## 2024-05-13 LAB — HGB BLD-MCNC: 10.9 G/DL (ref 11.7–15.7)

## 2024-05-13 PROCEDURE — 250N000021 HC RX MED A9270 GY (STAT IND- M) 250: Performed by: REGISTERED NURSE

## 2024-05-13 PROCEDURE — 250N000013 HC RX MED GY IP 250 OP 250 PS 637: Performed by: ADVANCED PRACTICE MIDWIFE

## 2024-05-13 PROCEDURE — 250N000011 HC RX IP 250 OP 636: Mod: JZ | Performed by: ADVANCED PRACTICE MIDWIFE

## 2024-05-13 PROCEDURE — T1013 SIGN LANG/ORAL INTERPRETER: HCPCS | Mod: U3

## 2024-05-13 PROCEDURE — 36415 COLL VENOUS BLD VENIPUNCTURE: CPT | Performed by: REGISTERED NURSE

## 2024-05-13 PROCEDURE — 250N000013 HC RX MED GY IP 250 OP 250 PS 637: Performed by: REGISTERED NURSE

## 2024-05-13 PROCEDURE — 3E0234Z INTRODUCTION OF SERUM, TOXOID AND VACCINE INTO MUSCLE, PERCUTANEOUS APPROACH: ICD-10-PCS | Performed by: ADVANCED PRACTICE MIDWIFE

## 2024-05-13 PROCEDURE — 90471 IMMUNIZATION ADMIN: CPT | Performed by: REGISTERED NURSE

## 2024-05-13 PROCEDURE — 85018 HEMOGLOBIN: CPT | Performed by: REGISTERED NURSE

## 2024-05-13 PROCEDURE — 90716 VAR VACCINE LIVE SUBQ: CPT | Performed by: REGISTERED NURSE

## 2024-05-13 RX ORDER — AMOXICILLIN 250 MG
1 CAPSULE ORAL DAILY
Qty: 100 TABLET | Refills: 0 | Status: SHIPPED | OUTPATIENT
Start: 2024-05-13

## 2024-05-13 RX ORDER — MEDROXYPROGESTERONE ACETATE 150 MG/ML
150 INJECTION, SUSPENSION INTRAMUSCULAR ONCE
Status: COMPLETED | OUTPATIENT
Start: 2024-05-13 | End: 2024-05-13

## 2024-05-13 RX ORDER — MULTIVIT WITH MINERALS/LUTEIN
250 TABLET ORAL DAILY
Qty: 90 TABLET | Refills: 0 | Status: SHIPPED | OUTPATIENT
Start: 2024-05-13

## 2024-05-13 RX ORDER — LANOLIN ALCOHOL/MO/W.PET/CERES
1000 CREAM (GRAM) TOPICAL DAILY
Qty: 90 TABLET | Refills: 0 | Status: SHIPPED | OUTPATIENT
Start: 2024-05-13

## 2024-05-13 RX ORDER — MULTIVIT WITH MINERALS/LUTEIN
250 TABLET ORAL DAILY
Status: DISCONTINUED | OUTPATIENT
Start: 2024-05-13 | End: 2024-05-13 | Stop reason: HOSPADM

## 2024-05-13 RX ORDER — FERROUS SULFATE 325(65) MG
325 TABLET ORAL DAILY
Status: DISCONTINUED | OUTPATIENT
Start: 2024-05-13 | End: 2024-05-13 | Stop reason: HOSPADM

## 2024-05-13 RX ORDER — FERROUS SULFATE 325(65) MG
325 TABLET ORAL DAILY
Qty: 90 TABLET | Refills: 0 | Status: SHIPPED | OUTPATIENT
Start: 2024-05-13

## 2024-05-13 RX ORDER — LANOLIN ALCOHOL/MO/W.PET/CERES
1000 CREAM (GRAM) TOPICAL DAILY
Status: DISCONTINUED | OUTPATIENT
Start: 2024-05-13 | End: 2024-05-13 | Stop reason: HOSPADM

## 2024-05-13 RX ORDER — IBUPROFEN 600 MG/1
600 TABLET, FILM COATED ORAL EVERY 6 HOURS PRN
Qty: 60 TABLET | Refills: 0 | Status: SHIPPED | OUTPATIENT
Start: 2024-05-13

## 2024-05-13 RX ORDER — ACETAMINOPHEN 325 MG/1
650 TABLET ORAL EVERY 6 HOURS PRN
Qty: 100 TABLET | Refills: 0 | Status: SHIPPED | OUTPATIENT
Start: 2024-05-13

## 2024-05-13 RX ADMIN — Medication 250 MG: at 14:05

## 2024-05-13 RX ADMIN — FERROUS SULFATE TAB 325 MG (65 MG ELEMENTAL FE) 325 MG: 325 (65 FE) TAB at 14:05

## 2024-05-13 RX ADMIN — DOCUSATE SODIUM 100 MG: 100 CAPSULE, LIQUID FILLED ORAL at 07:51

## 2024-05-13 RX ADMIN — VARICELLA VIRUS VACCINE LIVE 0.5 ML: 1350 INJECTION, POWDER, LYOPHILIZED, FOR SUSPENSION SUBCUTANEOUS at 14:49

## 2024-05-13 RX ADMIN — CYANOCOBALAMIN TAB 1000 MCG 1000 MCG: 1000 TAB at 14:05

## 2024-05-13 RX ADMIN — MEDROXYPROGESTERONE ACETATE 150 MG: 150 INJECTION, SUSPENSION INTRAMUSCULAR at 14:08

## 2024-05-13 ASSESSMENT — ACTIVITIES OF DAILY LIVING (ADL)
ADLS_ACUITY_SCORE: 18

## 2024-05-13 NOTE — LACTATION NOTE
This note was copied from a baby's chart.  Consult for:  1st time breastfeeding, SGA     Infant Name: David    Infant's Primary Care Clinic: [facility]    Delivery Information:  David was born at Gestational Age: 38w4d via vaginal delivery on 2024 3:10 AM     Maternal Health History:  Maternal past medical history, problem list and prior to admission medications reviewed and unremarkable.  Recently immigrated from [facility].    Maternal Breast Exam:  Kaelyn noted breast growth and sensitivity in early pregnancy. She denies any history of breast/chest injury or surgery. Her breasts are soft and symmetrical with bilateral intact, everted nipples. She has been able to hand express colostrum. ?    Breastfeeding/ Lactation History: first time breastfeeding, limited knowledge of breastfeeding.   Planning to breastfeed and bottlefeed formula.  Has to go back to work in 3 months.  She is a .    Infant information: David was SGA at birth and has age appropriate output and weight loss.      Weight Change Since Birth: -6% at 1 day old     Oral exam of baby:  Not done as he was sleeping at the time of LC visit    Feeding History: Has been nursing frequently, Kaelyn was taught hand expression by her bedside RN.  Was able to get 3mls of colostrum.    Feeding Assessment:  Not visualized.  Told Kaelyn to let her nurse know if she wants lactation to help with a feeding.  Additional teaching can be done at that time.    Education:   [x] Expected  feeding patterns in the first few days (pg. 38 of Your Guide to To Postpartum and [place] [facility])/ the Second Night  [x] Stages of milk production  [x] Benefits of hand expression of colostrum  [x] Early feeding cues     [x] Benefits of feeding on cue  [x] Benefits of skin to skin  [] Breastfeeding positions  [] Tips to get and maintain a deep latch  [] Nutritive vs.non-nutritive sucking  [] Gentle breast compressions as needed to enhance milk transfer  [] How to tell when baby  is finished  [x] How to tell if baby is getting enough  [x] Expected  output  []  weight loss  [x] Infant Feeding Log  [] [facility] Breastfeeding/Pumping videos  [x] Signs breastfeeding is going well (comfortable latch, audible swallows, age appropriate output and weight loss)    [x] Tips to prevent engorgement  [x] Signs of engorgement  [x] Tips to manage engorgement  [x] Pumping recommendations (based on patient need)  [x] [facility] breast pump part/infant feeding supplies cleaning recommendations  [x] Inpatient breastfeeding support  [] Outpatient lactation resources    Not all topics were discussed as the  had very limited time.  Answered Kaelyn's questions and discussed following up with the baby's pediatrician after discharge for weight checks.      Handouts: How to Keep Your Breast Pump Kit Clean and Infant Feeding Log (Week 1, Your Guide to Postpartum & [place] Care Book)    Home Breast Pump: Gave Medela Pump in Style Maxflow and showed patient how to use it.  Discussed using 21mm flanges for best fit.    Plan: Continue breastfeeding on cue with RN support as needed, goal of 8-12 feedings per day.     Encourage frequent skin to skin and hand expression.     Encouraged follow up with outpatient lactation consultant  as needed after discharge. Family plans to follow up with [facility].      Bibi Muse, RN, IBCLC   Lactation Consultant  [place]: [facility] [phone]  Office: [phone]

## 2024-05-13 NOTE — CONSULTS
"5/13/24: MILDRED received order for consult for \"Elevated Risk Score\" and recent immigrant. This patient is known to MILDRED who spoke with her during her pregnancy and facilitated connection to resources. Patient was connected to [facility], who connected back with MILDRED after referral was placed to report that patient is enrolled in programming with them and receiving support during her pregnancy and will continue postpartum.     Initial SW progress note from 3/26 copied below. Please re-consult SW if there are any immediate needs or questions prior to patient's discharge from postpartum today.     Social Work Consult     Reason for Consult: SW received referral for patient for resources related to her pregnancy.      Data: MILDRED spoke with Kaelyn on the phone this morning with the support from a . Kaelyn reports that she is currently 31 weeks pregnant. She is a recent immigrant from [facility]. Kaelyn has [facility] insurance.      Assessment: Kaelyn reported that she does not have any baby supplies yet for her baby. Discussed [employer], which is a program that works with Kaelyn's insurance and can provide her with a car seat. Kaelyn was interested in a referral to this program. MILDRED placed referral.      Also discussed WIC and informed Kaelyn about this supplemental nutrition program, which she would automatically qualify for due to her current medical assistance insurance. Kaelyn was also interested in a referral to this program. MILDRED placed referral.      Finally, MILDRED discussed the Family Home Visiting Program through [facility]. This program can support her during and after her pregnancy and birth of her baby with this such as: adjusting to new baby, providing postpartum care, assisting with resources, supporting bonding, supporting development, and providing a safe sleep space (pack 'n play) for her baby. Kaelyn was also interested in this referral. MILDRED placed referral.      Plan: " "Kaelyn did not have any additional questions or needs for SW today. SW provided Kaelyn with phone number, and can remain available for any questions or needs that may arise throughout her pregnancy.      BRIAN French, [facility]  Maternal and Child Health   M-F 08:00-16:30 on [employer]   Office Phone: [phone]  [email or web address]     After hours social work can be reached via [employer] @ \"Peds SW After Hours On Call 1620 to 08\"  Weekend on-site social work can be reached via [employer] @ \"Peds SW Weekend Onsite 08 to 1630\"  "

## 2024-05-13 NOTE — DISCHARGE SUMMARY
[facility] Discharge Summary    Kaelyn Reed MRN# [id number]   Age: 23 year old YOB: 2000     Date of Admission:  5/10/2024  Date of Discharge::  5/13/2024  Admitting Physician:  FRANKI Rowe CNM  Discharge Physician:  FRANKI Arauz CNM    Encounter completed with in-person Italian speaking , Judith Bull present.  Home clinic: [facility]          Admission Diagnoses:   Maternity([place] 5/22/24) IOL  Encounter for induction of labor          Discharge Diagnosis:     Normal spontaneous vaginal delivery  Intrauterine pregnancy at 38 weeks gestation          Procedures:     Procedure(s): No additional procedures performed       No other significant procedures performed during this admission           Medications Prior to Admission:     Medications Prior to Admission   Medication Sig Dispense Refill Last Dose    Prenatal Vit-Fe Fumarate-FA (PRENATAL MULTIVITAMIN W/IRON) 27-0.8 MG tablet Take 1 tablet by mouth daily 90 tablet 3 Past Month    vitamin D3 (CHOLECALCIFEROL) 50 mcg (2000 units) tablet Take 1 tablet (50 mcg) by mouth daily Take one tablet daily. 90 tablet 3     [DISCONTINUED] acetaminophen (TYLENOL) 325 MG tablet Take 2 tablets (650 mg) by mouth every 6 hours as needed for mild pain Start after Delivery. 100 tablet 0     [DISCONTINUED] cyanocobalamin (VITAMIN B-12) 1000 MCG tablet Take 1 tablet (1,000 mcg) by mouth daily (Patient not taking: Reported on 5/3/2024) 90 tablet 2     [DISCONTINUED] doxylamine (UNISOM) 25 MG TABS tablet Take 0.5 tablets (12.5 mg) by mouth every 4 hours as needed for other (prenatal nausea/vomiting) (Patient not taking: Reported on 11/13/2023) 90 tablet 1     [DISCONTINUED] ferrous sulfate (FEROSUL) 325 (65 Fe) MG tablet Take 1 tablet (325 mg) by mouth daily (with breakfast) (Patient not taking: Reported on 5/3/2024) 90 tablet 2     [DISCONTINUED] ibuprofen (ADVIL/MOTRIN) 600 MG tablet Take 1  tablet (600 mg) by mouth every 6 hours as needed for moderate pain Start after delivery 60 tablet 0     [DISCONTINUED] pyridOXINE (VITAMIN B6) 25 MG tablet Take 1 tablet (25 mg) by mouth every 8 hours as needed (prenatal nausea/vomiting) (Patient not taking: Reported on 5/3/2024) 90 tablet 1     [DISCONTINUED] senna-docusate (SENOKOT-S/PERICOLACE) 8.6-50 MG tablet Take 1 tablet by mouth daily Start after delivery. 100 tablet 0     [DISCONTINUED] vitamin C (ASCORBIC ACID) 250 MG tablet Take 1 tablet (250 mg) by mouth daily (Patient not taking: Reported on 5/3/2024) 90 tablet 2              Discharge Medications:     Current Discharge Medication List        CONTINUE these medications which have CHANGED    Details   acetaminophen (TYLENOL) 325 MG tablet Take 2 tablets (650 mg) by mouth every 6 hours as needed for mild pain Start after Delivery.  Qty: 100 tablet, Refills: 0    Associated Diagnoses:  (normal spontaneous vaginal delivery)      cyanocobalamin (VITAMIN B-12) 1000 MCG tablet Take 1 tablet (1,000 mcg) by mouth daily  Qty: 90 tablet, Refills: 0    Associated Diagnoses: Iron deficiency anemia secondary to inadequate dietary iron intake      ferrous sulfate (FEROSUL) 325 (65 Fe) MG tablet Take 1 tablet (325 mg) by mouth daily  Qty: 90 tablet, Refills: 0    Associated Diagnoses: Iron deficiency anemia secondary to inadequate dietary iron intake      ibuprofen (ADVIL/MOTRIN) 600 MG tablet Take 1 tablet (600 mg) by mouth every 6 hours as needed for moderate pain Start after delivery  Qty: 60 tablet, Refills: 0    Associated Diagnoses:  (normal spontaneous vaginal delivery)      senna-docusate (SENOKOT-S/PERICOLACE) 8.6-50 MG tablet Take 1 tablet by mouth daily Start after delivery.  Qty: 100 tablet, Refills: 0    Associated Diagnoses:  (normal spontaneous vaginal delivery)      vitamin C (ASCORBIC ACID) 250 MG tablet Take 1 tablet (250 mg) by mouth daily  Qty: 90 tablet, Refills: 0    Associated  Diagnoses: Iron deficiency anemia secondary to inadequate dietary iron intake           CONTINUE these medications which have NOT CHANGED    Details   Prenatal Vit-Fe Fumarate-FA (PRENATAL MULTIVITAMIN W/IRON) 27-0.8 MG tablet Take 1 tablet by mouth daily  Qty: 90 tablet, Refills: 3      vitamin D3 (CHOLECALCIFEROL) 50 mcg (2000 units) tablet Take 1 tablet (50 mcg) by mouth daily Take one tablet daily.  Qty: 90 tablet, Refills: 3    Associated Diagnoses: Third trimester pregnancy           STOP taking these medications       doxylamine (UNISOM) 25 MG TABS tablet Comments:   Reason for Stopping:         pyridOXINE (VITAMIN B6) 25 MG tablet Comments:   Reason for Stopping:                     Consultations:   No consultations were requested during this admission          Brief History of Labor:   Patient presented to labor and delivery 5/10/24 for scheduled induction of labor for fetal growth restriction. On admission SVE closed/long/high, louis 1. PV misoprostol was started. First dose 5/10/24 @ 2048. At 0330 (5/11/24) contractions were every 1-3 minutes and more uncomfortable for patient and she elected PO vistaril to help with sleep. At 0730 SVE 3/50/-2; louis 6. At 1100 SVE 4/50/-1. PCN was started at 1127 for GBS prophylaxis. At 1519 pitocin was started to continue induction of labor. She walked the halls and used the birthing ball until 2112 when she received an epidural for pain management. At 2331 SVE 6/80/0, pitocin at 4mu. Amniotomy was performed with patient consent. Scant amount of clear fluid noted. At 0133 (5/12/2024) CNM paged to bedside for Category II FHR tracing. SVE revealed complete dilation, effacement and 0 station. Pitocin shut off at 0148 due to recurrent variable decelerations. Pushing started. Pitocin resumed at 0205 with improved FHR tracing.      Delivery Course:  Pt became complete at 0142 and started pushing 0142. Delivered a vigorous baby male at 0310 who was immediately placed on  "mom's abdomen. No nuchal. IV pitocin started after delivery of infant per protocol. Umbilical cord was double clamped and cut by MEJIA Mckay after the cord stopped pulsing. Cord blood obtained. Placenta spontaneously delivered intact at 0314 without difficulty via Schultze mechanism. Inspection of vagina and perineum revealed intact perineum. Fundus is firm and midline.  Mom and baby are stable.      Assessment Day of Discharge    Vital signs:  Temp: 98  F (36.7  C) Temp src: Oral BP: 103/54 Pulse: 70   Resp: 16   O2 Device: None (Room air)   Height: 147.3 cm (4' 9.99\") Weight: 55.3 kg (122 lb)  Estimated body mass index is 25.51 kg/m  as calculated from the following:    Height as of this encounter: 1.473 m (4' 9.99\").    Weight as of this encounter: 55.3 kg (122 lb).    Patient Vitals for the past 24 hrs:   BP Temp Temp src Pulse Resp   05/13/24 0118 103/54 98  F (36.7  C) Oral 70 16   05/12/24 2105 104/54 97.9  F (36.6  C) Oral 84 16   05/12/24 1800 101/50 97.8  F (36.6  C) Oral 83 16   05/12/24 1300 102/46 98.1  F (36.7  C) Oral 77 16         Breasts: Soft, filling  Nipples: Intact, Non-tender  Abdomen: Soft, Non-tender    Uterus: Fundus Firm, Non-tender, located -2 below the umbilicus   Lochia: Rubra, appropriate amount    Perineum:  Well-approximated, healing well  Lower Extremities: negative Edema Bilateral, Negative Geoffrey's Sign           Hospital Course:   The patient's hospital course was unremarkable.  On discharge, pain was well controlled. Vaginal bleeding is similar to peak menstrual flow.  Voiding without difficulty.  Ambulating well and tolerating a normal diet.  No fever.  Breastfeeding well.  Infant is stable.  Has not had a bowel movement. Mood stable, has strong family supports identified.   Kaelyn Reed was discharged on post-partum day #1.    Post-partum hemoglobin:   Hemoglobin   Date Value Ref Range Status   05/13/2024 10.9 (L) 11.7 - 15.7 g/dL Final        Rh:positive,   Rubella " status:immune  Plan for contraception: Depo-provera, to be given prior to discharge.  Reviewed Chapter One of  [facility]  Family Book including warning signs of postpartum, activity level, avoiding IC for 6 weeks, Tub soaks BID, Kegels, abdominal exercises, breast care,  postpartum depression/anxiety.  Reviewed how to establish/maintain milk supply, nipple care, pumping for storage, fore/hind milk, wet/dirty diapers to expect each day, resources prn if questions or concerns.  Pt verbalized understanding with teach back.          Discharge Instructions and Follow-Up:     Discharge diet: Regular, Iron Rich, High Fiber, Minimum 80oz water daily   Discharge activity: Pelvic rest: abstain from intercourse and do not use tampons for 6 week(s)   Discharge follow-up: Follow up with CNM in 2 and 6 weeks     Wound care: Drink plenty of fluids  Avoid constipation   Tub soaks as needed.           Discharge Disposition:     Discharged to home           FRANKI Arauz CNM

## 2024-05-13 NOTE — DISCHARGE INSTRUCTIONS
"El período posparto: Instrucciones de cuidado-Instrucciones de cuidado  Postpartum: Care Instructions  Instrucciones de cuidado  Después del parto (período posparto), fitzpatrick cuerpo pasa por muchos cambios. Algunos de estos cambios suceden roger varias semanas. Roger las horas posteriores al parto, el cuerpo comienza a recuperarse y se prepara para el amamantamiento. Es posible que roger nicole tiempo se sienta sensible. Las hormonas pueden cambiar fitzpatrick estado de ánimo sin advertencia y sin motivo aparente.  Roger las primeras semanas después del parto, muchas mujeres tienen emociones que cambian de amor a jaspal. Es posible que le resulte difícil dormir. Es posible que llore mucho. [place] se llama \"melancolía de la maternidad\". Estas emociones abrumadoras suelen desaparecer dentro de unos días o semanas. Prieto es importante hablar con fitzpatrick médico acerca de elvie sentimientos.  Roger las primeras semanas después del parto, es fácil cansarse demasiado o sentirse abrumada. No leanna demasiados esfuerzos. Descanse cada vez que pueda, acepte la ayuda de los demás, coma fanny y daphnie abundantes líquidos.  En el primer par de semanas después de sarina a shea, es posible que fitzpatrick médico o partera deseen verla y hacer un plan para cualquier atención de seguimiento que usted pueda necesitar. Probablemente tendrá yeimi rand completa de posparto dentro de los primeros 3 meses después del parto. En deanne momento, fitzpatrick médico o partera revisarán fitzpatrick recuperación del parto. Él o inocencio también verán cómo está enfrentando usted elvie emociones y conversarán sobre elvie inquietudes y preguntas.  La atención de seguimiento es yeimi parte clave de fitzpatrick tratamiento y seguridad. Asegúrese de hacer y acudir a todas las citas, y llame a fitzpatrick médico si está teniendo problemas. También es yeimi buena idea saber los resultados de elvie exámenes y mantener yeimi lista de los medicamentos que omero.   Cómo puede cuidarse en el hogar?  [place] o descanse cuando fitzpatrick bebé lo leanna.  Si es " posible, permita que ghada familiares o ghada amigos la ayuden con las tareas del hogar. No intente hacerlo todo usted akosua.  Si tiene hemorroides o hinchazón o dolor alrededor de la abertura de la vagina, pruebe aplicarse frío y calor. Puede aplicarse hielo o yeimi compresa fría en la jackie roger 10 a 20 minutos cada vez. Póngase un paño weston entre el hielo y la piel. Además, trate de sentarse en algunos centímetros de agua tibia (baño de asiento) 3 veces al día y después de las evacuaciones.  [place] los analgésicos (medicamentos para el dolor) exactamente según las indicaciones.  Si el médico le recetó un analgésico, tómelo según las indicaciones.  Si no está tomando un analgésico recetado, pregúntele a fitzpatrick médico si puede ilya jamel de venta oral.  Coma más fibra para evitar el estreñimiento. Incluya alimentos james panes y cereales integrales, verduras crudas, frutas crudas y secas, y frijoles (habichuelas).  Ivania mucho líquido. Si tiene yeimi enfermedad renal, cardíaca o hepática y tiene que restringir los líquidos, hable con fitzpatrick médico antes de aumentar la cantidad de líquido que celsa.  No se lave el interior de la vagina con líquidos (lavados vaginales).  Si tiene puntos de sutura, mantenga la jackie limpia con agua tibia, ya sea echándola o rociándola sobre la jackie externa de la vagina y el ano después de usar el baño.  Lleve yeimi lista de preguntas para hacerle a fitzpatrick médico o partera. Ghada preguntas podrían ser acerca de lo siguiente:  Cambios en los senos, james bultos o sensibilidad.  Cuándo esperar que regrese fitzpatrick período menstrual.  Qué forma de anticoncepción es la más adecuada para usted.  El peso que aumentó roger el embarazo.  Las opciones de ejercicio.  Qué alimentos y bebidas son mejores para usted, sobre todo si está amamantando.  Problemas que podría tener con la lactancia.  Cuándo puede tener relaciones sexuales. Algunas mujeres guillermina vez quieran hablar sobre lubricantes vaginales.  Cualquier sentimiento de  tristeza o inquietud que tenga.   Cuándo debe pedir ayuda?  Comparta esta información con fitzpatrick sascha, fitzpatrick cheko o yeimi amiga. Pueden ayudarla a prestar atención a las señales de advertencia.  Llame al 911  en cualquier momento que considere que necesita atención de urgencia. Por ejemplo, llame si:    Tiene pensamientos de lastimarse o de hacerle daño a fitzpatrick bebé o a otra persona.     Se desmayó (perdió el conocimiento).     Tiene dolor en el pecho, le falta el aire o tose sonal.     Tiene convulsiones.   Dónde obtener ayuda las 24 horas del día, los 7 días de la semana   Si usted o alguien que conoce habla de suicidio, autolesionarse, yeimi crisis de malia mental, yeimi crisis por consumo de sustancias o cualquier otro tipo de malestar psíquico, obtenga ayuda de inmediato. Usted puede:    Marcar [phone] para llamar a la línea de prevención del suicidio y crisis.     Llamar al [phone] ([phone]).     Enviar un mensaje de texto que diga HOME al [phone] para acceder a la línea de mensajes de texto en casos de crisis.   Considere guardar estos números en fitzpatrick teléfono.  Visite [employer] para obtener más información o conversar en línea.  Llame a fitzpatrick médico ahora mismo o busque atención médica de inmediato si:    Tiene señales de hemorragia (sangrado excesivo), james:  Sangrado vaginal intenso. [place] significa que está empapando yeimi o más toallas sanitarias en yeimi hora. O expulsa coágulos de sonal más grandes que un huevo.  Se siente mareada o aturdida, o siente james si se fuera a desmayar.  Se siente tan cansada o débil que no puede hacer elvie actividades habituales.  Latidos cardíacos acelerados o irregulares.  Dolor abdominal nuevo o más intenso.     Tiene señales de infección, tales james:  Fiebre.  Micción frecuente o dolorosa o sonal en la orina.  Secreción vaginal con olor desagradable.  Dolor abdominal nuevo o más intenso.     Tiene síntomas de un coágulo de sonal en la pierna (llamado trombosis venosa  "profunda), tales james:  Dolor en la pantorrilla, la parte posterior de la rodilla, el muslo o la jessica.  Hinchazón en la pierna o la jessica.  Un cambio de color en la pierna o la jessica. La piel podría estar enrojecida o morada, según cuál sea fitzpatrick color de piel normal.     Tiene señales de preeclampsia, tales james:  Hinchazón repentina de la marco antonio, las josé miguel o los pies.  Nuevos problemas de la vista (james oscurecimiento, rimma borroso o rimma puntos).  Dolor de shade intenso.     Tiene señales de insuficiencia cardíaca, tales james:  Nueva o mayor falta de aire.  Nueva o mayor hinchazón en las piernas, los tobillos o los pies.  Aumento repentino de peso, james más de 2 o 3 libras (0.9 kg o 1.4 kg) en un día o 5 libras (2.3 kg) en yeimi semana.  Se siente tan cansada o débil que no puede hacer elvie actividades habituales.     Se sometió a un alivio del dolor raquídeo o epidural y tiene:  Dolor de espalda nuevo o peor.  Aumento del dolor, la hinchazón, la temperatura o el enrojecimiento en el lugar de la inyección.  Hormigueo, debilidad o entumecimiento en las piernas o la jessica.   Preste especial atención a los cambios en fitzpatrick malia y asegúrese de comunicarse con fitzpatrick médico si:    El sangrado vaginal no disminuye.     Siente tristeza, ansiedad o desesperanza roger más de algunos días.     Tiene problemas con los senos o el amamantamiento.    Dónde puede encontrar más información en inglés?  Vaya a [email or web address].[email or web address]  Escriba Z768 en la búsqueda para aprender más acerca de \"El período posparto: Instrucciones de cuidado-Instrucciones de cuidado.\"  Revisado: 10 alden, 2023               Versión del contenido: 14.0    [id number] Healthwise, [email or web address].   Las instrucciones de cuidado fueron adaptadas bajo licencia por fitzpatrick profesional de atención médica. Si usted tiene preguntas sobre yeimi afección médica o sobre estas instrucciones, siempre pregunte a fitzpatrick profesional de malia. [employer], [email or web address] niega " toda garantía o responsabilidad por fitzpatrick uso de esta información.

## 2024-05-13 NOTE — PLAN OF CARE
Goal Outcome Evaluation:      Plan of Care Reviewed With: patient    Overall Patient Progress: improvingOverall Patient Progress: improving       [id number]  Vitals: VSS - soft Bps but no low BPs  Pain: denies pain and declined medication  Fundus: firm, midline U-1  Scant lochia  GI/: WDL, adequate voids, passing gas, no BM  Lungs: clear   EDS: not done  Videos: not done  Birth Certificate: not done  Plan: in person  today to assist with paperwork

## 2024-05-13 NOTE — PLAN OF CARE
Goal Outcome Evaluation:  Patient is stable and denies pain or discomfort. Discharge today with baby. Discharge education and instructions reviewed with  and verbalized understanding. Encouraged to call clinic if questions arises after discharge.

## 2024-05-15 ENCOUNTER — MEDICAL CORRESPONDENCE (OUTPATIENT)
Dept: HEALTH INFORMATION MANAGEMENT | Facility: CLINIC | Age: 24
End: 2024-05-15
Payer: MEDICAID

## 2024-05-15 ENCOUNTER — TRANSFERRED RECORDS (OUTPATIENT)
Dept: HEALTH INFORMATION MANAGEMENT | Facility: CLINIC | Age: 24
End: 2024-05-15
Payer: MEDICAID

## 2024-07-12 RX ORDER — PRENATAL VIT/IRON FUM/FOLIC AC 27MG-0.8MG
1 TABLET ORAL DAILY
Qty: 90 TABLET | Refills: 3 | Status: SHIPPED | OUTPATIENT
Start: 2024-07-12

## 2024-08-07 ENCOUNTER — OFFICE VISIT (OUTPATIENT)
Dept: FAMILY MEDICINE | Facility: CLINIC | Age: 24
End: 2024-08-07
Payer: COMMERCIAL

## 2024-08-07 VITALS
BODY MASS INDEX: 21.1 KG/M2 | SYSTOLIC BLOOD PRESSURE: 96 MMHG | OXYGEN SATURATION: 98 % | DIASTOLIC BLOOD PRESSURE: 54 MMHG | RESPIRATION RATE: 22 BRPM | HEART RATE: 74 BPM | HEIGHT: 58 IN | TEMPERATURE: 98.2 F | WEIGHT: 100.5 LBS

## 2024-08-07 DIAGNOSIS — Z30.9 ENCOUNTER FOR CONTRACEPTIVE MANAGEMENT, UNSPECIFIED TYPE: Primary | ICD-10-CM

## 2024-08-07 PROBLEM — Z13.31 POSITIVE SCREENING FOR DEPRESSION ON 9-ITEM PATIENT HEALTH QUESTIONNAIRE (PHQ-9): Status: RESOLVED | Noted: 2023-11-08 | Resolved: 2024-08-07

## 2024-08-07 PROBLEM — O09.899 HISTORY OF PRETERM DELIVERY, CURRENTLY PREGNANT: Status: RESOLVED | Noted: 2024-03-22 | Resolved: 2024-08-07

## 2024-08-07 PROBLEM — Z59.87 INADEQUATE MATERIAL RESOURCES: Status: RESOLVED | Noted: 2024-03-22 | Resolved: 2024-08-07

## 2024-08-07 PROBLEM — O26.10 LOW MATERNAL WEIGHT GAIN, UNSPECIFIED TRIMESTER: Status: RESOLVED | Noted: 2024-03-26 | Resolved: 2024-08-07

## 2024-08-07 PROBLEM — O36.5990 FETAL GROWTH RESTRICTION ANTEPARTUM: Status: RESOLVED | Noted: 2024-04-26 | Resolved: 2024-08-07

## 2024-08-07 PROBLEM — Z34.90 ENCOUNTER FOR INDUCTION OF LABOR: Status: RESOLVED | Noted: 2024-05-10 | Resolved: 2024-08-07

## 2024-08-07 PROBLEM — O09.90 HIGH-RISK PREGNANCY, UNSPECIFIED TRIMESTER: Status: RESOLVED | Noted: 2023-11-13 | Resolved: 2024-08-07

## 2024-08-07 LAB — HCG UR QL: NEGATIVE

## 2024-08-07 PROCEDURE — 81025 URINE PREGNANCY TEST: CPT | Performed by: FAMILY MEDICINE

## 2024-08-07 PROCEDURE — 11981 INSERTION DRUG DLVR IMPLANT: CPT | Performed by: FAMILY MEDICINE

## 2024-08-07 ASSESSMENT — PAIN SCALES - GENERAL: PAINLEVEL: NO PAIN (0)

## 2024-08-07 NOTE — PATIENT INSTRUCTIONS
"If the dressing feels too tight, you may unroll the outer wrap and re-wrap it more loosely. Remove the wrap and gauze tomorrow morning. The strip(s) under the gauze will fall off on their own. This usually happens within 1 week. Avoid immersing the Nexplanon site in water for the next 5 days. It is ok to shower, but you should avoid taking a bath or swimming. Please call if you notice worsening pain, bleeding or pus-like drainage near the Nexplanon site.    Please remember that the Nexplanon will not be effective at preventing pregnancy for the next 7 days. If you are sexually active during the next 7 days, you should use a \"backup\" form of pregnancy prevention such as condoms.  "

## 2024-08-07 NOTE — PROGRESS NOTES
"  {PROVIDER CHARTING PREFERENCE:284013}    Berto Art is a 23 year old, presenting for the following health issues:  Contraception      8/7/2024     4:44 PM   Additional Questions   Roomed by Mitali CONTEH     Contraception    History of Present Illness       Reason for visit:  Nexplanon placement    She eats 0-1 servings of fruits and vegetables daily.She consumes 4 sweetened beverage(s) daily.She exercises with enough effort to increase her heart rate 9 or less minutes per day.  She exercises with enough effort to increase her heart rate 3 or less days per week.   She is taking medications regularly.       {MA/LPN/RN Pre-Provider Visit Orders- hCG/UA/Strep (Optional):317801}  {SUPERLIST (Optional):426979}  {additonal problems for provider to add (Optional):773331}    {ROS Picklists (Optional):446317}      Objective    BP 96/54 (BP Location: Left arm, Patient Position: Sitting, Cuff Size: Adult Regular)   Pulse 74   Temp 98.2  F (36.8  C) (Temporal)   Resp 22   Ht 1.473 m (4' 10\")   Wt 45.6 kg (100 lb 8 oz)   LMP  (LMP Unknown)   SpO2 98%   Breastfeeding Yes   BMI 21.00 kg/m    Body mass index is 21 kg/m .  Physical Exam   {Exam List (Optional):089444}    {Diagnostic Test Results (Optional):013204}        Signed Electronically by: Beck Moya MD  {Email feedback regarding this note to [email or web address]   :009935}  "

## 2024-09-10 PROBLEM — O09.899 MATERNAL VARICELLA, NON-IMMUNE: Status: RESOLVED | Noted: 2024-03-25 | Resolved: 2024-09-10

## 2024-09-10 PROBLEM — Z28.39 MATERNAL VARICELLA, NON-IMMUNE: Status: RESOLVED | Noted: 2024-03-25 | Resolved: 2024-09-10

## 2024-09-10 PROBLEM — E55.9 VITAMIN D DEFICIENCY: Status: ACTIVE | Noted: 2024-09-10
